# Patient Record
Sex: MALE | Race: OTHER | Employment: UNEMPLOYED | URBAN - METROPOLITAN AREA
[De-identification: names, ages, dates, MRNs, and addresses within clinical notes are randomized per-mention and may not be internally consistent; named-entity substitution may affect disease eponyms.]

---

## 2018-05-21 ENCOUNTER — HOSPITAL ENCOUNTER (EMERGENCY)
Age: 63
Discharge: HOME | End: 2018-05-21
Attending: EMERGENCY MEDICINE | Admitting: EMERGENCY MEDICINE
Payer: MEDICARE

## 2018-05-21 ENCOUNTER — APPOINTMENT (OUTPATIENT)
Dept: GENERAL RADIOLOGY | Age: 63
End: 2018-05-21
Attending: EMERGENCY MEDICINE
Payer: MEDICARE

## 2018-05-21 VITALS
HEIGHT: 62 IN | SYSTOLIC BLOOD PRESSURE: 157 MMHG | OXYGEN SATURATION: 100 % | RESPIRATION RATE: 20 BRPM | WEIGHT: 120 LBS | DIASTOLIC BLOOD PRESSURE: 76 MMHG | HEART RATE: 59 BPM | TEMPERATURE: 98.6 F | BODY MASS INDEX: 22.08 KG/M2

## 2018-05-21 DIAGNOSIS — E87.6 HYPOKALEMIA: ICD-10-CM

## 2018-05-21 DIAGNOSIS — L03.116 LEFT LEG CELLULITIS: ICD-10-CM

## 2018-05-21 DIAGNOSIS — F20.0 PARANOID SCHIZOPHRENIA (HCC): Primary | ICD-10-CM

## 2018-05-21 DIAGNOSIS — N39.0 URINARY TRACT INFECTION WITHOUT HEMATURIA, SITE UNSPECIFIED: ICD-10-CM

## 2018-05-21 LAB
ALBUMIN SERPL-MCNC: 3.8 G/DL (ref 3.4–5)
ALBUMIN/GLOB SERPL: 1.1 {RATIO} (ref 0.8–1.7)
ALP SERPL-CCNC: 28 U/L (ref 45–117)
ALT SERPL-CCNC: 19 U/L (ref 16–61)
AMPHET UR QL SCN: NEGATIVE
ANION GAP SERPL CALC-SCNC: 8 MMOL/L (ref 3–18)
APAP SERPL-MCNC: <2 UG/ML (ref 10–30)
APPEARANCE UR: CLEAR
AST SERPL-CCNC: 22 U/L (ref 15–37)
BACTERIA URNS QL MICRO: ABNORMAL /HPF
BARBITURATES UR QL SCN: NEGATIVE
BASOPHILS # BLD: 0 K/UL (ref 0–0.06)
BASOPHILS NFR BLD: 0 % (ref 0–2)
BENZODIAZ UR QL: NEGATIVE
BILIRUB SERPL-MCNC: 0.7 MG/DL (ref 0.2–1)
BILIRUB UR QL: NEGATIVE
BUN SERPL-MCNC: 23 MG/DL (ref 7–18)
BUN/CREAT SERPL: 18 (ref 12–20)
CALCIUM SERPL-MCNC: 9.5 MG/DL (ref 8.5–10.1)
CANNABINOIDS UR QL SCN: NEGATIVE
CHLORIDE SERPL-SCNC: 106 MMOL/L (ref 100–108)
CO2 SERPL-SCNC: 27 MMOL/L (ref 21–32)
COCAINE UR QL SCN: NEGATIVE
COLOR UR: ABNORMAL
CREAT SERPL-MCNC: 1.25 MG/DL (ref 0.6–1.3)
DIFFERENTIAL METHOD BLD: ABNORMAL
EOSINOPHIL # BLD: 0.1 K/UL (ref 0–0.4)
EOSINOPHIL NFR BLD: 1 % (ref 0–5)
EPITH CASTS URNS QL MICRO: ABNORMAL /LPF (ref 0–5)
ERYTHROCYTE [DISTWIDTH] IN BLOOD BY AUTOMATED COUNT: 14 % (ref 11.6–14.5)
ETHANOL SERPL-MCNC: 3 MG/DL (ref 0–3)
GLOBULIN SER CALC-MCNC: 3.4 G/DL (ref 2–4)
GLUCOSE SERPL-MCNC: 95 MG/DL (ref 74–99)
GLUCOSE UR STRIP.AUTO-MCNC: NEGATIVE MG/DL
HCT VFR BLD AUTO: 37 % (ref 36–48)
HDSCOM,HDSCOM: NORMAL
HGB BLD-MCNC: 12.6 G/DL (ref 13–16)
HGB UR QL STRIP: NEGATIVE
HYALINE CASTS URNS QL MICRO: ABNORMAL /LPF (ref 0–2)
KETONES UR QL STRIP.AUTO: NEGATIVE MG/DL
LEUKOCYTE ESTERASE UR QL STRIP.AUTO: ABNORMAL
LYMPHOCYTES # BLD: 2.7 K/UL (ref 0.9–3.6)
LYMPHOCYTES NFR BLD: 26 % (ref 21–52)
MCH RBC QN AUTO: 31 PG (ref 24–34)
MCHC RBC AUTO-ENTMCNC: 34.1 G/DL (ref 31–37)
MCV RBC AUTO: 90.9 FL (ref 74–97)
METHADONE UR QL: NEGATIVE
MONOCYTES # BLD: 0.9 K/UL (ref 0.05–1.2)
MONOCYTES NFR BLD: 9 % (ref 3–10)
MUCOUS THREADS URNS QL MICRO: ABNORMAL /LPF
NEUTS SEG # BLD: 6.6 K/UL (ref 1.8–8)
NEUTS SEG NFR BLD: 64 % (ref 40–73)
NITRITE UR QL STRIP.AUTO: NEGATIVE
OPIATES UR QL: NEGATIVE
PCP UR QL: NEGATIVE
PH UR STRIP: 5.5 [PH] (ref 5–8)
PLATELET # BLD AUTO: 232 K/UL (ref 135–420)
PMV BLD AUTO: 12.9 FL (ref 9.2–11.8)
POTASSIUM SERPL-SCNC: 3.4 MMOL/L (ref 3.5–5.5)
PROT SERPL-MCNC: 7.2 G/DL (ref 6.4–8.2)
PROT UR STRIP-MCNC: 30 MG/DL
RBC # BLD AUTO: 4.07 M/UL (ref 4.7–5.5)
RBC #/AREA URNS HPF: ABNORMAL /HPF (ref 0–5)
SALICYLATES SERPL-MCNC: 3.7 MG/DL (ref 2.8–20)
SODIUM SERPL-SCNC: 141 MMOL/L (ref 136–145)
SP GR UR REFRACTOMETRY: 1.02 (ref 1–1.03)
UROBILINOGEN UR QL STRIP.AUTO: 1 EU/DL (ref 0.2–1)
VALPROATE SERPL-MCNC: <3 UG/ML (ref 50–100)
WBC # BLD AUTO: 10.3 K/UL (ref 4.6–13.2)
WBC URNS QL MICRO: ABNORMAL /HPF (ref 0–5)

## 2018-05-21 PROCEDURE — 81001 URINALYSIS AUTO W/SCOPE: CPT | Performed by: EMERGENCY MEDICINE

## 2018-05-21 PROCEDURE — 74011250637 HC RX REV CODE- 250/637: Performed by: EMERGENCY MEDICINE

## 2018-05-21 PROCEDURE — 90471 IMMUNIZATION ADMIN: CPT

## 2018-05-21 PROCEDURE — 80307 DRUG TEST PRSMV CHEM ANLYZR: CPT | Performed by: EMERGENCY MEDICINE

## 2018-05-21 PROCEDURE — 85025 COMPLETE CBC W/AUTO DIFF WBC: CPT | Performed by: EMERGENCY MEDICINE

## 2018-05-21 PROCEDURE — 90715 TDAP VACCINE 7 YRS/> IM: CPT | Performed by: EMERGENCY MEDICINE

## 2018-05-21 PROCEDURE — 80053 COMPREHEN METABOLIC PANEL: CPT | Performed by: EMERGENCY MEDICINE

## 2018-05-21 PROCEDURE — 99284 EMERGENCY DEPT VISIT MOD MDM: CPT

## 2018-05-21 PROCEDURE — 74011250636 HC RX REV CODE- 250/636: Performed by: EMERGENCY MEDICINE

## 2018-05-21 PROCEDURE — 80164 ASSAY DIPROPYLACETIC ACD TOT: CPT | Performed by: EMERGENCY MEDICINE

## 2018-05-21 PROCEDURE — 73552 X-RAY EXAM OF FEMUR 2/>: CPT

## 2018-05-21 RX ORDER — POTASSIUM CHLORIDE 750 MG/1
10 CAPSULE, EXTENDED RELEASE ORAL DAILY
Qty: 5 CAP | Refills: 0 | Status: SHIPPED | OUTPATIENT
Start: 2018-05-21 | End: 2018-05-26

## 2018-05-21 RX ORDER — CEPHALEXIN 500 MG/1
500 CAPSULE ORAL 3 TIMES DAILY
Qty: 21 CAP | Refills: 0 | Status: SHIPPED | OUTPATIENT
Start: 2018-05-21 | End: 2018-05-28

## 2018-05-21 RX ORDER — CEPHALEXIN 500 MG/1
500 CAPSULE ORAL 2 TIMES DAILY
Qty: 14 CAP | Refills: 0 | Status: SHIPPED | OUTPATIENT
Start: 2018-05-21 | End: 2018-05-21

## 2018-05-21 RX ORDER — POTASSIUM CHLORIDE 20 MEQ/1
20 TABLET, EXTENDED RELEASE ORAL
Status: COMPLETED | OUTPATIENT
Start: 2018-05-21 | End: 2018-05-21

## 2018-05-21 RX ORDER — CEPHALEXIN 250 MG/1
500 CAPSULE ORAL
Status: COMPLETED | OUTPATIENT
Start: 2018-05-21 | End: 2018-05-21

## 2018-05-21 RX ADMIN — POTASSIUM CHLORIDE 20 MEQ: 20 TABLET, EXTENDED RELEASE ORAL at 14:11

## 2018-05-21 RX ADMIN — TETANUS TOXOID, REDUCED DIPHTHERIA TOXOID AND ACELLULAR PERTUSSIS VACCINE, ADSORBED 0.5 ML: 5; 2.5; 8; 8; 2.5 SUSPENSION INTRAMUSCULAR at 13:19

## 2018-05-21 RX ADMIN — CEPHALEXIN 500 MG: 250 CAPSULE ORAL at 14:11

## 2018-05-21 NOTE — DISCHARGE INSTRUCTIONS
Cellulitis: Care Instructions  Your Care Instructions    Cellulitis is a skin infection. It often occurs after a break in the skin from a scrape, cut, bite, or puncture, or after a rash. The doctor has checked you carefully, but problems can develop later. If you notice any problems or new symptoms, get medical treatment right away. Follow-up care is a key part of your treatment and safety. Be sure to make and go to all appointments, and call your doctor if you are having problems. It's also a good idea to know your test results and keep a list of the medicines you take. How can you care for yourself at home? · Take your antibiotics as directed. Do not stop taking them just because you feel better. You need to take the full course of antibiotics. · Prop up the infected area on pillows to reduce pain and swelling. Try to keep the area above the level of your heart as often as you can. · If your doctor told you how to care for your wound, follow your doctor's instructions. If you did not get instructions, follow this general advice:  ¨ Wash the wound with clean water 2 times a day. Don't use hydrogen peroxide or alcohol, which can slow healing. ¨ You may cover the wound with a thin layer of petroleum jelly, such as Vaseline, and a nonstick bandage. ¨ Apply more petroleum jelly and replace the bandage as needed. · Be safe with medicines. Take pain medicines exactly as directed. ¨ If the doctor gave you a prescription medicine for pain, take it as prescribed. ¨ If you are not taking a prescription pain medicine, ask your doctor if you can take an over-the-counter medicine. To prevent cellulitis in the future  · Try to prevent cuts, scrapes, or other injuries to your skin. Cellulitis most often occurs where there is a break in the skin. · If you get a scrape, cut, mild burn, or bite, wash the wound with clean water as soon as you can to help avoid infection.  Don't use hydrogen peroxide or alcohol, which can slow healing. · If you have swelling in your legs (edema), support stockings and good skin care may help prevent leg sores and cellulitis. · Take care of your feet, especially if you have diabetes or other conditions that increase the risk of infection. Wear shoes and socks. Do not go barefoot. If you have athlete's foot or other skin problems on your feet, talk to your doctor about how to treat them. When should you call for help? Call your doctor now or seek immediate medical care if:  ? · You have signs that your infection is getting worse, such as:  ¨ Increased pain, swelling, warmth, or redness. ¨ Red streaks leading from the area. ¨ Pus draining from the area. ¨ A fever. ? · You get a rash. ? Watch closely for changes in your health, and be sure to contact your doctor if:  ? · You are not getting better after 1 day (24 hours). ? · You do not get better as expected. Where can you learn more? Go to http://timmy-jillian.info/. Gladis Mauro in the search box to learn more about \"Cellulitis: Care Instructions. \"  Current as of: October 13, 2016  Content Version: 11.4  © 9597-2161 [employer]. Care instructions adapted under license by [employer] (which disclaims liability or warranty for this information). If you have questions about a medical condition or this instruction, always ask your healthcare professional. [place] [address] any warranty or liability for your use of this information. Schizophrenia: Care Instructions  Your Care Instructions    Schizophrenia is a disease that makes it hard to think clearly, manage emotions, and interact with other people. It can cause:  · Delusions. These are beliefs that are not real.  · Hallucinations. These are things that you may see or hear that are not really there. · Paranoia. This is the belief that others are lying, cheating, using you, or trying to harm you.   The disease may change your ability to enjoy life, express emotions, or function. At times, you may hear voices, behave strangely, have trouble speaking or understanding speech, or keep to yourself. The goal of treatment is to lower your stress and help your brain function normally. You may need lifelong treatment with medicines and counseling to keep your schizophrenia under control. When schizophrenia is not treated, the risks are higher for suicide, a hospital stay, and other problems. Early treatment called coordinated specialty care [facility]) may help a person who is having his or her first episode of psychotic thoughts. Ask your doctor about [address]. Follow-up care is a key part of your treatment and safety. Be sure to make and go to all appointments, and call your doctor if you are having problems. It's also a good idea to know your test results and keep a list of the medicines you take. How can you care for yourself at home? · Be safe with medicines. Take your medicines exactly as prescribed. Call your doctor if you think you are having a problem with your medicine. When you feel good, you may think that you do not need your medicines. But it is important to keep taking them as scheduled. · Go to your counseling sessions. Call and talk with your counselor if you can't attend or if you don't think the sessions are helping. Do not just stop going. · Eat a healthy diet. Talk with a dietitian about what type of diet may be best for you. · Do not use alcohol or illegal drugs. · Keep the numbers for these national suicide hotlines: 5-393-047-TALK (6-217.281.1542) and 6-517-OTQOVID (0-715.807.4574). If you or someone you know talks about suicide or feeling hopeless, get help right away. What should you do if someone in your family has schizophrenia? · Learn about the disease and how it may get worse over time. · Remind your family member that you love him or her.   · Make a plan with all family members about how to take care of your loved one when his or her symptoms are bad. · Talk about your fears and concerns and those of other family members. Seek counseling if needed. · Do not focus attention only on the person who is in treatment. · Remind yourself that it will take time for changes to occur. · Do not blame yourself for the disease. · Know your legal rights and the legal rights of your family member. · Take care of yourself. Stay involved with your own interests, such as your career, hobbies, and friends. Use exercise, positive self-talk, relaxation, and deep breathing to help lower your stress. · Give yourself time to grieve. You may need to deal with emotions such as anger, fear, and frustration. After you work through your feelings, you will be better able to care for yourself and your family. · If you are having a hard time with your feelings and your interactions with your family member, talk with a counselor. When should you call for help? Call 911 anytime you think you may need emergency care. For example, call if:  ? · You are thinking about suicide or are threatening suicide. ? · You feel like hurting yourself or someone else. ?Call your doctor now or seek immediate medical care if:  ? · You hear voices. ? · You think someone is trying to harm you. ? · You cannot concentrate or are easily confused. ? Watch closely for changes in your health, and be sure to contact your doctor if:  ? · You are having trouble taking care of yourself. ? · You cannot attend your counseling sessions. Where can you learn more? Go to http://timmy-jillian.info/. Enter H521 in the search box to learn more about \"Schizophrenia: Care Instructions. \"  Current as of: May 12, 2017  Content Version: 11.4  © 3398-8602 [employer]. Care instructions adapted under license by [employer] (which disclaims liability or warranty for this information).  If you have questions about a medical condition or this instruction, always ask your healthcare professional. [place] [address] any warranty or liability for your use of this information.

## 2018-05-21 NOTE — ED TRIAGE NOTES
Patient's wife brought  here for a mental health evaluation because he is not on his medications for about 1 week. Patient states that he also missed and appointment with his psychiatrist. Patient denies suicidal and homicidal ideations. Sepsis Screening completed    (  )Patient meets SIRS criteria. ( x )Patient does not meet SIRS criteria.       SIRS Criteria is achieved when two or more of the following are present   Temperature < 96.8°F (36°C) or > 100.9°F (38.3°C)   Heart Rate > 90 beats per minute   Respiratory Rate > 20 breaths per minute   WBC count > 12,000 or <4,000 or > 10% bands

## 2018-05-21 NOTE — ED PROVIDER NOTES
EMERGENCY DEPARTMENT HISTORY AND PHYSICAL EXAM    Date: 5/21/2018  Patient Name: Alena De Leon    History of Presenting Illness     Chief Complaint   Patient presents with    Mental Health Problem         History Provided By: Patient and Patient's Sister    Chief Complaint: mental health problem   Duration: 1 Weeks  Timing:  Worsening  Modifying Factors: No medication taken for symptoms  Associated Symptoms: behavior change    Additional History (Context):   12:23 PM  Alena De Leon is a 59 y.o. male with PMHX of left prosthesis who presents to the emergency department C/O mental health problem onset 1 week ago. Pt's sister reports slight behavior change including \"staring in space\" and cutting his shirt off. Pt's sister reports she's has to ask him questions multiple times for a response. Pt's sister reports she noticed empty prescription bottles including Depakote and Risperidone. Pt states he's been out of prescribed medications for ~1 week. Patient is followed by Dr. Emilie Morris, psychology. No associated symptoms. C/O left hip pain. Associated symptoms include left AKA erythema. Pt reports he cut callus of left AKA two weeks ago. Pt denies suicidal ideations, auditory hallucinations, homicidal ideations, self injury and any other Sx or complaints. PCP: None    Current Outpatient Prescriptions   Medication Sig Dispense Refill    potassium chloride SA (MICRO-K) 10 mEq capsule Take 1 Cap by mouth daily for 5 days. 5 Cap 0    cephALEXin (KEFLEX) 500 mg capsule Take 1 Cap by mouth three (3) times daily for 7 days. 21 Cap 0    levothyroxine (SYNTHROID) 100 mcg tablet Take 100 mcg by mouth Daily (before breakfast).  lisinopril (PRINIVIL, ZESTRIL) 40 mg tablet Take 40 mg by mouth daily.  fenofibrate nanocrystallized (TRICOR) 145 mg tablet Take  by mouth daily.  DIVALPROEX SODIUM (DEPAKOTE PO) Take  by mouth.  RISPERIDONE (RISPERDAL PO) Take  by mouth.       CALCIUM CARBONATE (CALCIUM 500 PO) Take  by mouth.  cyanocobalamin (VITAMIN B-12) 1,000 mcg tablet Take 1,000 mcg by mouth daily. Past History     Past Medical History:  Past Medical History:   Diagnosis Date    Cancer [facility])     Thyroid    Psychiatric disorder     psizophrenic paranoid    Thyroid disease        Past Surgical History:  Past Surgical History:   Procedure Laterality Date    HX ORTHOPAEDIC      amputation left    HX OTHER SURGICAL      thyroid removal    HX THYROIDECTOMY  2008       Family History:  History reviewed. No pertinent family history. Social History:  Social History   Substance Use Topics    Smoking status: Current Every Day Smoker     Packs/day: 0.50     Years: 45.00    Smokeless tobacco: Never Used    Alcohol use No       Allergies: Allergies   Allergen Reactions    Prolixin [Fluphenazine Hcl] Seizures         Review of Systems   Review of Systems   Constitutional: Negative for activity change, appetite change, fever and unexpected weight change. HENT: Negative for congestion and sore throat. Eyes: Negative for pain and redness. Respiratory: Negative for cough and shortness of breath. Cardiovascular: Negative for chest pain and palpitations. Gastrointestinal: Negative for abdominal pain, diarrhea, nausea and vomiting. Endocrine: Negative for polydipsia and polyuria. Genitourinary: Negative for difficulty urinating and dysuria. Musculoskeletal: Positive for arthralgias (hip pain). Negative for back pain and neck pain. Skin: Negative for pallor and rash. Neurological: Negative for headaches. Psychiatric/Behavioral: Positive for behavioral problems. Negative for hallucinations and suicidal ideas. (-) homicidal ideations     All other systems reviewed and are negative.       Physical Exam     Vitals:    05/21/18 1213   BP: 157/76   Pulse: (!) 59   Resp: 20   Temp: 98.6 °F (37 °C)   SpO2: 100%   Weight: 54.4 kg (120 lb)   Height: 5' 2\" (1.575 m)     Physical Exam Constitutional: He is oriented to person, place, and time. He appears well-developed and well-nourished. HENT:   Head: Normocephalic and atraumatic. Right Ear: External ear normal.   Left Ear: External ear normal.   Nose: Nose normal.   Mouth/Throat: Oropharynx is clear and moist.   Eyes: Conjunctivae and EOM are normal. Pupils are equal, round, and reactive to light. Neck: Normal range of motion. Neck supple. No JVD present. No tracheal deviation present. No thyromegaly present. Cardiovascular: Normal rate, regular rhythm, normal heart sounds and intact distal pulses. Exam reveals no gallop and no friction rub. No murmur heard. Pulmonary/Chest: Effort normal and breath sounds normal. No respiratory distress. He has no wheezes. He has no rales. Abdominal: Soft. Bowel sounds are normal. He exhibits no distension and no mass. There is no tenderness. There is no rebound and no guarding. Musculoskeletal: Normal range of motion. Neurological: He is alert and oriented to person, place, and time. He has normal reflexes. No cranial nerve deficit. He exhibits normal muscle tone. Coordination normal.   CN II-XII intact, no facial droop or asymmetry; No pronator drift; finger-nose-finger intact; good  and equal strength 5/5 bilateral upper and lower extremities; DTRs: 2+ upper and lower extremities, symmetric bilaterally; sensation is intact to light touch and position sense upper and lower extremities symmetric bilaterally;  Gait intact with left leg prothesis and cane. Skin: Skin is warm and dry. No rash noted. There is erythema. Erythema and skin broke down at the left AKA stump 4x4 cm area. Psychiatric: He has a normal mood and affect. His behavior is normal.   Nursing note and vitals reviewed.       Diagnostic Study Results     Labs -     Recent Results (from the past 12 hour(s))   CBC WITH AUTOMATED DIFF    Collection Time: 05/21/18 12:34 PM   Result Value Ref Range    WBC 10.3 4.6 - 13.2 K/uL    RBC 4.07 (L) 4.70 - 5.50 M/uL    HGB 12.6 (L) 13.0 - 16.0 g/dL    HCT 37.0 36.0 - 48.0 %    MCV 90.9 74.0 - 97.0 FL    MCH 31.0 24.0 - 34.0 PG    MCHC 34.1 31.0 - 37.0 g/dL    RDW 14.0 11.6 - 14.5 %    PLATELET 473 123 - 289 K/uL    MPV 12.9 (H) 9.2 - 11.8 FL    NEUTROPHILS 64 40 - 73 %    LYMPHOCYTES 26 21 - 52 %    MONOCYTES 9 3 - 10 %    EOSINOPHILS 1 0 - 5 %    BASOPHILS 0 0 - 2 %    ABS. NEUTROPHILS 6.6 1.8 - 8.0 K/UL    ABS. LYMPHOCYTES 2.7 0.9 - 3.6 K/UL    ABS. MONOCYTES 0.9 0.05 - 1.2 K/UL    ABS. EOSINOPHILS 0.1 0.0 - 0.4 K/UL    ABS. BASOPHILS 0.0 0.0 - 0.06 K/UL    DF AUTOMATED     METABOLIC PANEL, COMPREHENSIVE    Collection Time: 05/21/18 12:34 PM   Result Value Ref Range    Sodium 141 136 - 145 mmol/L    Potassium 3.4 (L) 3.5 - 5.5 mmol/L    Chloride 106 100 - 108 mmol/L    CO2 27 21 - 32 mmol/L    Anion gap 8 3.0 - 18 mmol/L    Glucose 95 74 - 99 mg/dL    BUN 23 (H) 7.0 - 18 MG/DL    Creatinine 1.25 0.6 - 1.3 MG/DL    BUN/Creatinine ratio 18 12 - 20      GFR est AA >60 >60 ml/min/1.73m2    GFR est non-AA 59 (L) >60 ml/min/1.73m2    Calcium 9.5 8.5 - 10.1 MG/DL    Bilirubin, total 0.7 0.2 - 1.0 MG/DL    ALT (SGPT) 19 16 - 61 U/L    AST (SGOT) 22 15 - 37 U/L    Alk.  phosphatase 28 (L) 45 - 117 U/L    Protein, total 7.2 6.4 - 8.2 g/dL    Albumin 3.8 3.4 - 5.0 g/dL    Globulin 3.4 2.0 - 4.0 g/dL    A-G Ratio 1.1 0.8 - 1.7     ACETAMINOPHEN    Collection Time: 05/21/18 12:34 PM   Result Value Ref Range    Acetaminophen level <2 (L) 10.0 - 30.0 ug/mL   ETHYL ALCOHOL    Collection Time: 05/21/18 12:34 PM   Result Value Ref Range    ALCOHOL(ETHYL),SERUM 3 0 - 3 MG/DL   SALICYLATE    Collection Time: 05/21/18 12:34 PM   Result Value Ref Range    Salicylate level 3.7 2.8 - 20 MG/DL   URINALYSIS W/ RFLX MICROSCOPIC    Collection Time: 05/21/18 12:55 PM   Result Value Ref Range    Color DARK YELLOW      Appearance CLEAR      Specific gravity 1.023 1.005 - 1.030      pH (UA) 5.5 5.0 - 8.0      Protein 30 (A) NEG mg/dL    Glucose NEGATIVE  NEG mg/dL    Ketone NEGATIVE  NEG mg/dL    Bilirubin NEGATIVE  NEG      Blood NEGATIVE  NEG      Urobilinogen 1.0 0.2 - 1.0 EU/dL    Nitrites NEGATIVE  NEG      Leukocyte Esterase SMALL (A) NEG     DRUG SCREEN, URINE    Collection Time: 05/21/18 12:55 PM   Result Value Ref Range    BENZODIAZEPINES NEGATIVE  NEG      BARBITURATES NEGATIVE  NEG      THC (TH-CANNABINOL) NEGATIVE  NEG      OPIATES NEGATIVE  NEG      PCP(PHENCYCLIDINE) NEGATIVE  NEG      COCAINE NEGATIVE  NEG      AMPHETAMINES NEGATIVE  NEG      METHADONE NEGATIVE  NEG      HDSCOM (NOTE)    URINE MICROSCOPIC ONLY    Collection Time: 05/21/18 12:55 PM   Result Value Ref Range    WBC 4 to 10 0 - 5 /hpf    RBC 0 to 3 0 - 5 /hpf    Epithelial cells FEW 0 - 5 /lpf    Bacteria FEW (A) NEG /hpf    Mucus 1+ (A) NEG /lpf    Hyaline cast 0 to 3 0 - 2 /lpf       Radiologic Studies -   XR FEMUR LT 2 V   Final Result   IMPRESSION:  1. Minimally displaced fracture of the left hip greater trochanter. 2. Moderate severity left hip joint osteoarthritis. 3. Prior left above the knee in interpretation. As read by the radiologist.     CT Results  (Last 48 hours)    None        CXR Results  (Last 48 hours)    None          Medications given in the ED-  Medications   diph,Pertuss(AC),Tet Vac-PF (BOOSTRIX) suspension 0.5 mL (0.5 mL IntraMUSCular Given 5/21/18 1319)   potassium chloride (K-DUR, KLOR-CON) SR tablet 20 mEq (20 mEq Oral Given 5/21/18 1411)   cephALEXin (KEFLEX) capsule 500 mg (500 mg Oral Given 5/21/18 1411)         Medical Decision Making   I am the first provider for this patient. I reviewed the vital signs, available nursing notes, past medical history, past surgical history, family history and social history. Vital Signs-Reviewed the patient's vital signs.     Pulse Oximetry Analysis - 100% on RA      Records Reviewed: Nursing Notes and Old Medical Records    Provider Notes (Medical Decision Making):   Mental Health ddx: psychosis, depression, self mutilation behavior  Hip pain ddx: left hip pain, dislocation, infection, cellulitis, pressure ulcer    Procedures:  Procedures    ED Course:   12:23 PM Initial assessment performed. The patients presenting problems have been discussed, and they are in agreement with the care plan formulated and outlined with them. I have encouraged them to ask questions as they arise throughout their visit. 12:58 PM Discussed patient's history, exam, and available diagnostics results with So Viramontes, who states patient has been missing appointment and that why he hasnt received medication refill. No show for 5/2/18 with psychiatrist.    1:24 PM Discussed patient's history, exam, and available diagnostics results with Amalia Up RN at Dr. Josefina Montano office, who will send refill to [facility]. Wants sister to call and make an appointment for today. Diagnosis and Disposition   Discussion:   Pt was stable in ED without hallucination, SI, or HI. Left hip xray unremarkable. Left stump has mild cellulitis with skin break down no evidence of abscess. Labs remarkable for hypokalemia, patient given potassium. Case discussed with [facility] and Dr. Josefina Montano office . Pt. refills for Depakote and Risperidone were refilled. Pt sister will call today for follow up appointment. Pt given Keflex and potassium for cellulitis and low potassium, respectively . UA shows possible UTI which should be adequately treated with Keflex      DISCHARGE NOTE:  2:09 PM  Anjum Falcon's  results have been reviewed with him. He has been counseled regarding his diagnosis, treatment, and plan. He verbally conveys understanding and agreement of the signs, symptoms, diagnosis, treatment and prognosis and additionally agrees to follow up as discussed. He also agrees with the care-plan and conveys that all of his questions have been answered.   I have also provided discharge instructions for him that include: educational information regarding their diagnosis and treatment, and list of reasons why they would want to return to the ED prior to their follow-up appointment, should his condition change. He has been provided with education for proper emergency department utilization. CLINICAL IMPRESSION:    1. Paranoid schizophrenia ([address]    2. Left leg cellulitis    3. Urinary tract infection without hematuria, site unspecified    4. Hypokalemia        PLAN:  1. D/C Home  2. Discharge Medication List as of 5/21/2018  2:08 PM      START taking these medications    Details   potassium chloride SA (MICRO-K) 10 mEq capsule Take 1 Cap by mouth daily for 5 days. , Print, Disp-5 Cap, R-0      cephALEXin (KEFLEX) 500 mg capsule Take 1 Cap by mouth two (2) times a day for 7 days. , Print, Disp-14 Cap, R-0         CONTINUE these medications which have NOT CHANGED    Details   levothyroxine (SYNTHROID) 100 mcg tablet Take 100 mcg by mouth Daily (before breakfast). , Historical Med      lisinopril (PRINIVIL, ZESTRIL) 40 mg tablet Take 40 mg by mouth daily. , Historical Med      fenofibrate nanocrystallized (TRICOR) 145 mg tablet Take  by mouth daily. , Historical Med      DIVALPROEX SODIUM (DEPAKOTE PO) Take  by mouth., Historical Med      RISPERIDONE (RISPERDAL PO) Take  by mouth., Historical Med      CALCIUM CARBONATE (CALCIUM 500 PO) Take  by mouth., Historical Med      cyanocobalamin (VITAMIN B-12) 1,000 mcg tablet Take 1,000 mcg by mouth daily. , Historical Med           3. Follow-up Information     Follow up With Details Comments Frankie Todd MD Schedule an appointment as soon as possible for a visit For primary care follow up [address]  [address]  [phone]      [facility] EMERGENCY DE[facility] Go to As needed, as symptoms worsen [address] [id number]  [phone]        _______________________________    Attestations:   This note is prepared by Juan Frederick, acting as Scribe for Carrie Fall MD.    Carrie Fall MD:  The scribe's documentation has been prepared under my direction and personally reviewed by me in its entirety.   I confirm that the note above accurately reflects all work, treatment, procedures, and medical decision making performed by me.  _______________________________

## 2018-06-04 ENCOUNTER — HOSPITAL ENCOUNTER (OUTPATIENT)
Dept: PREADMISSION TESTING | Age: 63
Discharge: HOME | End: 2018-06-04
Payer: MEDICARE

## 2018-06-04 VITALS — BODY MASS INDEX: 21.35 KG/M2 | WEIGHT: 116 LBS | HEIGHT: 62 IN

## 2018-06-04 LAB
ANION GAP SERPL CALC-SCNC: 8 MMOL/L (ref 3–18)
ATRIAL RATE: 82 BPM
BUN SERPL-MCNC: 10 MG/DL (ref 7–18)
BUN/CREAT SERPL: 9 (ref 12–20)
CALCIUM SERPL-MCNC: 9.7 MG/DL (ref 8.5–10.1)
CALCULATED P AXIS, ECG09: 88 DEGREES
CALCULATED R AXIS, ECG10: 36 DEGREES
CALCULATED T AXIS, ECG11: 69 DEGREES
CHLORIDE SERPL-SCNC: 106 MMOL/L (ref 100–108)
CO2 SERPL-SCNC: 29 MMOL/L (ref 21–32)
CREAT SERPL-MCNC: 1.12 MG/DL (ref 0.6–1.3)
DIAGNOSIS, 93000: NORMAL
GLUCOSE SERPL-MCNC: 88 MG/DL (ref 74–99)
HCT VFR BLD AUTO: 37.3 % (ref 36–48)
HGB BLD-MCNC: 12.4 G/DL (ref 13–16)
P-R INTERVAL, ECG05: 176 MS
POTASSIUM SERPL-SCNC: 4.4 MMOL/L (ref 3.5–5.5)
Q-T INTERVAL, ECG07: 360 MS
QRS DURATION, ECG06: 92 MS
QTC CALCULATION (BEZET), ECG08: 420 MS
SODIUM SERPL-SCNC: 143 MMOL/L (ref 136–145)
VENTRICULAR RATE, ECG03: 82 BPM

## 2018-06-04 PROCEDURE — 85018 HEMOGLOBIN: CPT | Performed by: OTOLARYNGOLOGY

## 2018-06-04 PROCEDURE — 93005 ELECTROCARDIOGRAM TRACING: CPT

## 2018-06-04 PROCEDURE — 80048 BASIC METABOLIC PNL TOTAL CA: CPT | Performed by: OTOLARYNGOLOGY

## 2018-06-04 RX ORDER — CEFAZOLIN SODIUM/WATER 2 G/20 ML
2 SYRINGE (ML) INTRAVENOUS ONCE
Status: CANCELLED | OUTPATIENT
Start: 2018-06-04 | End: 2018-06-04

## 2018-06-04 RX ORDER — ACETAMINOPHEN 10 MG/ML
1000 INJECTION, SOLUTION INTRAVENOUS
Status: CANCELLED | OUTPATIENT
Start: 2018-06-27 | End: 2018-06-28

## 2018-06-04 RX ORDER — SODIUM CHLORIDE, SODIUM LACTATE, POTASSIUM CHLORIDE, CALCIUM CHLORIDE 600; 310; 30; 20 MG/100ML; MG/100ML; MG/100ML; MG/100ML
125 INJECTION, SOLUTION INTRAVENOUS CONTINUOUS
Status: CANCELLED | OUTPATIENT
Start: 2018-06-04

## 2018-06-04 NOTE — PERIOP NOTES
No sleep apnea or family history of malignant hyperthermia. Prosthetic left leg. Care fusion kit and instructions given and reviewed. Pt is not sure that PCP is aware of the surgery. No participation in clinical trial or research study. Does not meet criteria for special population at this time.

## 2018-06-26 ENCOUNTER — ANESTHESIA EVENT (OUTPATIENT)
Dept: SURGERY | Age: 63
End: 2018-06-26
Payer: MEDICARE

## 2018-06-27 ENCOUNTER — HOSPITAL ENCOUNTER (OUTPATIENT)
Age: 63
Setting detail: OBSERVATION
Discharge: HOME | End: 2018-06-28
Attending: OTOLARYNGOLOGY | Admitting: OTOLARYNGOLOGY
Payer: MEDICARE

## 2018-06-27 ENCOUNTER — ANESTHESIA (OUTPATIENT)
Dept: SURGERY | Age: 63
End: 2018-06-27
Payer: MEDICARE

## 2018-06-27 DIAGNOSIS — C73 THYROID CANCER (HCC): Primary | ICD-10-CM

## 2018-06-27 LAB
PTH-INTACT P EXCISION SERPL-MCNC: 9.8 PG/ML (ref 8–74)
SPECIMEN DRAWN SERPL: 1655

## 2018-06-27 PROCEDURE — 74011250637 HC RX REV CODE- 250/637: Performed by: OTOLARYNGOLOGY

## 2018-06-27 PROCEDURE — 77030011267 HC ELECTRD BLD COVD -A: Performed by: OTOLARYNGOLOGY

## 2018-06-27 PROCEDURE — 74011250636 HC RX REV CODE- 250/636: Performed by: OTOLARYNGOLOGY

## 2018-06-27 PROCEDURE — 74011250636 HC RX REV CODE- 250/636

## 2018-06-27 PROCEDURE — 77030002986 HC SUT PROL J&J -A: Performed by: OTOLARYNGOLOGY

## 2018-06-27 PROCEDURE — 77030011640 HC PAD GRND REM COVD -A: Performed by: OTOLARYNGOLOGY

## 2018-06-27 PROCEDURE — 77030006643: Performed by: ANESTHESIOLOGY

## 2018-06-27 PROCEDURE — 76010000149 HC OR TIME 1 TO 1.5 HR: Performed by: OTOLARYNGOLOGY

## 2018-06-27 PROCEDURE — 76060000033 HC ANESTHESIA 1 TO 1.5 HR: Performed by: OTOLARYNGOLOGY

## 2018-06-27 PROCEDURE — 77030010512 HC APPL CLP LIG J&J -C: Performed by: OTOLARYNGOLOGY

## 2018-06-27 PROCEDURE — 77030010507 HC ADH SKN DERMBND J&J -B: Performed by: OTOLARYNGOLOGY

## 2018-06-27 PROCEDURE — 88305 TISSUE EXAM BY PATHOLOGIST: CPT | Performed by: OTOLARYNGOLOGY

## 2018-06-27 PROCEDURE — 77030008462 HC STPLR SKN PROX J&J -A: Performed by: OTOLARYNGOLOGY

## 2018-06-27 PROCEDURE — 76210000006 HC OR PH I REC 0.5 TO 1 HR: Performed by: OTOLARYNGOLOGY

## 2018-06-27 PROCEDURE — 77030018354 HC SHR COAG HARM4 J&J -E: Performed by: OTOLARYNGOLOGY

## 2018-06-27 PROCEDURE — 77030020782 HC GWN BAIR PAWS FLX 3M -B: Performed by: OTOLARYNGOLOGY

## 2018-06-27 PROCEDURE — 83970 ASSAY OF PARATHORMONE: CPT | Performed by: OTOLARYNGOLOGY

## 2018-06-27 PROCEDURE — 74011000250 HC RX REV CODE- 250

## 2018-06-27 PROCEDURE — 36415 COLL VENOUS BLD VENIPUNCTURE: CPT | Performed by: OTOLARYNGOLOGY

## 2018-06-27 PROCEDURE — 77030008683 HC TU ET CUF COVD -A: Performed by: ANESTHESIOLOGY

## 2018-06-27 PROCEDURE — 88307 TISSUE EXAM BY PATHOLOGIST: CPT | Performed by: OTOLARYNGOLOGY

## 2018-06-27 PROCEDURE — 77030031139 HC SUT VCRL2 J&J -A: Performed by: OTOLARYNGOLOGY

## 2018-06-27 PROCEDURE — 77030002996 HC SUT SLK J&J -A: Performed by: OTOLARYNGOLOGY

## 2018-06-27 PROCEDURE — 77030002933 HC SUT MCRYL J&J -A: Performed by: OTOLARYNGOLOGY

## 2018-06-27 PROCEDURE — 77030038020 HC MANFLD NEPTUNE STRY -B: Performed by: OTOLARYNGOLOGY

## 2018-06-27 PROCEDURE — 74011000250 HC RX REV CODE- 250: Performed by: OTOLARYNGOLOGY

## 2018-06-27 PROCEDURE — 99218 HC RM OBSERVATION: CPT

## 2018-06-27 PROCEDURE — 77030013079 HC BLNKT BAIR HGGR 3M -A: Performed by: ANESTHESIOLOGY

## 2018-06-27 PROCEDURE — 77030008477 HC STYL SATN SLP COVD -A: Performed by: ANESTHESIOLOGY

## 2018-06-27 RX ORDER — ROCURONIUM BROMIDE 10 MG/ML
INJECTION, SOLUTION INTRAVENOUS AS NEEDED
Status: DISCONTINUED | OUTPATIENT
Start: 2018-06-27 | End: 2018-06-27 | Stop reason: HOSPADM

## 2018-06-27 RX ORDER — SODIUM CHLORIDE 0.9 % (FLUSH) 0.9 %
5-10 SYRINGE (ML) INJECTION AS NEEDED
Status: DISCONTINUED | OUTPATIENT
Start: 2018-06-27 | End: 2018-06-28 | Stop reason: HOSPADM

## 2018-06-27 RX ORDER — LISINOPRIL 20 MG/1
40 TABLET ORAL DAILY
Status: DISCONTINUED | OUTPATIENT
Start: 2018-06-28 | End: 2018-06-28 | Stop reason: HOSPADM

## 2018-06-27 RX ORDER — SODIUM CHLORIDE 0.9 % (FLUSH) 0.9 %
5-10 SYRINGE (ML) INJECTION AS NEEDED
Status: DISCONTINUED | OUTPATIENT
Start: 2018-06-27 | End: 2018-06-27 | Stop reason: HOSPADM

## 2018-06-27 RX ORDER — ACETAMINOPHEN 325 MG/1
650 TABLET ORAL
Status: DISCONTINUED | OUTPATIENT
Start: 2018-06-27 | End: 2018-06-28 | Stop reason: HOSPADM

## 2018-06-27 RX ORDER — PROPOFOL 10 MG/ML
INJECTION, EMULSION INTRAVENOUS AS NEEDED
Status: DISCONTINUED | OUTPATIENT
Start: 2018-06-27 | End: 2018-06-27 | Stop reason: HOSPADM

## 2018-06-27 RX ORDER — SODIUM CHLORIDE, SODIUM LACTATE, POTASSIUM CHLORIDE, CALCIUM CHLORIDE 600; 310; 30; 20 MG/100ML; MG/100ML; MG/100ML; MG/100ML
125 INJECTION, SOLUTION INTRAVENOUS CONTINUOUS
Status: DISCONTINUED | OUTPATIENT
Start: 2018-06-27 | End: 2018-06-28 | Stop reason: HOSPADM

## 2018-06-27 RX ORDER — ONDANSETRON 2 MG/ML
4 INJECTION INTRAMUSCULAR; INTRAVENOUS ONCE
Status: DISCONTINUED | OUTPATIENT
Start: 2018-06-27 | End: 2018-06-27 | Stop reason: HOSPADM

## 2018-06-27 RX ORDER — OXYCODONE AND ACETAMINOPHEN 7.5; 325 MG/1; MG/1
1 TABLET ORAL
Status: DISCONTINUED | OUTPATIENT
Start: 2018-06-27 | End: 2018-06-28 | Stop reason: HOSPADM

## 2018-06-27 RX ORDER — MIDAZOLAM HYDROCHLORIDE 1 MG/ML
INJECTION, SOLUTION INTRAMUSCULAR; INTRAVENOUS AS NEEDED
Status: DISCONTINUED | OUTPATIENT
Start: 2018-06-27 | End: 2018-06-27 | Stop reason: HOSPADM

## 2018-06-27 RX ORDER — DEXTROSE 50 % IN WATER (D50W) INTRAVENOUS SYRINGE
25-50 AS NEEDED
Status: DISCONTINUED | OUTPATIENT
Start: 2018-06-27 | End: 2018-06-27 | Stop reason: HOSPADM

## 2018-06-27 RX ORDER — DIVALPROEX SODIUM 250 MG/1
1500 TABLET, EXTENDED RELEASE ORAL
Status: DISCONTINUED | OUTPATIENT
Start: 2018-06-27 | End: 2018-06-28 | Stop reason: HOSPADM

## 2018-06-27 RX ORDER — NALOXONE HYDROCHLORIDE 0.4 MG/ML
0.2 INJECTION, SOLUTION INTRAMUSCULAR; INTRAVENOUS; SUBCUTANEOUS AS NEEDED
Status: DISCONTINUED | OUTPATIENT
Start: 2018-06-27 | End: 2018-06-27 | Stop reason: HOSPADM

## 2018-06-27 RX ORDER — FENTANYL CITRATE 50 UG/ML
INJECTION, SOLUTION INTRAMUSCULAR; INTRAVENOUS AS NEEDED
Status: DISCONTINUED | OUTPATIENT
Start: 2018-06-27 | End: 2018-06-27 | Stop reason: HOSPADM

## 2018-06-27 RX ORDER — LIDOCAINE HYDROCHLORIDE 20 MG/ML
INJECTION, SOLUTION EPIDURAL; INFILTRATION; INTRACAUDAL; PERINEURAL AS NEEDED
Status: DISCONTINUED | OUTPATIENT
Start: 2018-06-27 | End: 2018-06-27 | Stop reason: HOSPADM

## 2018-06-27 RX ORDER — ALBUTEROL SULFATE 0.83 MG/ML
2.5 SOLUTION RESPIRATORY (INHALATION) AS NEEDED
Status: DISCONTINUED | OUTPATIENT
Start: 2018-06-27 | End: 2018-06-27 | Stop reason: HOSPADM

## 2018-06-27 RX ORDER — DIPHENHYDRAMINE HYDROCHLORIDE 50 MG/ML
12.5 INJECTION, SOLUTION INTRAMUSCULAR; INTRAVENOUS
Status: DISCONTINUED | OUTPATIENT
Start: 2018-06-27 | End: 2018-06-27 | Stop reason: HOSPADM

## 2018-06-27 RX ORDER — SODIUM CHLORIDE 0.9 % (FLUSH) 0.9 %
5-10 SYRINGE (ML) INJECTION EVERY 8 HOURS
Status: DISCONTINUED | OUTPATIENT
Start: 2018-06-27 | End: 2018-06-28 | Stop reason: HOSPADM

## 2018-06-27 RX ORDER — GLYCOPYRROLATE 0.2 MG/ML
INJECTION INTRAMUSCULAR; INTRAVENOUS AS NEEDED
Status: DISCONTINUED | OUTPATIENT
Start: 2018-06-27 | End: 2018-06-27 | Stop reason: HOSPADM

## 2018-06-27 RX ORDER — CEFAZOLIN SODIUM/WATER 2 G/20 ML
2 SYRINGE (ML) INTRAVENOUS ONCE
Status: COMPLETED | OUTPATIENT
Start: 2018-06-27 | End: 2018-06-27

## 2018-06-27 RX ORDER — DEXAMETHASONE SODIUM PHOSPHATE 4 MG/ML
INJECTION, SOLUTION INTRA-ARTICULAR; INTRALESIONAL; INTRAMUSCULAR; INTRAVENOUS; SOFT TISSUE AS NEEDED
Status: DISCONTINUED | OUTPATIENT
Start: 2018-06-27 | End: 2018-06-27 | Stop reason: HOSPADM

## 2018-06-27 RX ORDER — ACETAMINOPHEN 10 MG/ML
1000 INJECTION, SOLUTION INTRAVENOUS
Status: COMPLETED | OUTPATIENT
Start: 2018-06-27 | End: 2018-06-27

## 2018-06-27 RX ORDER — FENTANYL CITRATE 50 UG/ML
25 INJECTION, SOLUTION INTRAMUSCULAR; INTRAVENOUS AS NEEDED
Status: DISCONTINUED | OUTPATIENT
Start: 2018-06-27 | End: 2018-06-27 | Stop reason: HOSPADM

## 2018-06-27 RX ORDER — LIDOCAINE HYDROCHLORIDE AND EPINEPHRINE 10; 10 MG/ML; UG/ML
INJECTION, SOLUTION INFILTRATION; PERINEURAL AS NEEDED
Status: DISCONTINUED | OUTPATIENT
Start: 2018-06-27 | End: 2018-06-27 | Stop reason: HOSPADM

## 2018-06-27 RX ORDER — FLUMAZENIL 0.1 MG/ML
0.2 INJECTION INTRAVENOUS
Status: DISCONTINUED | OUTPATIENT
Start: 2018-06-27 | End: 2018-06-27 | Stop reason: HOSPADM

## 2018-06-27 RX ORDER — SODIUM CHLORIDE, SODIUM LACTATE, POTASSIUM CHLORIDE, CALCIUM CHLORIDE 600; 310; 30; 20 MG/100ML; MG/100ML; MG/100ML; MG/100ML
1000 INJECTION, SOLUTION INTRAVENOUS CONTINUOUS
Status: DISCONTINUED | OUTPATIENT
Start: 2018-06-27 | End: 2018-06-27 | Stop reason: HOSPADM

## 2018-06-27 RX ORDER — MAGNESIUM SULFATE 100 %
4 CRYSTALS MISCELLANEOUS AS NEEDED
Status: DISCONTINUED | OUTPATIENT
Start: 2018-06-27 | End: 2018-06-27 | Stop reason: HOSPADM

## 2018-06-27 RX ORDER — HYDROMORPHONE HYDROCHLORIDE 2 MG/ML
0.5 INJECTION, SOLUTION INTRAMUSCULAR; INTRAVENOUS; SUBCUTANEOUS
Status: DISCONTINUED | OUTPATIENT
Start: 2018-06-27 | End: 2018-06-27 | Stop reason: HOSPADM

## 2018-06-27 RX ORDER — ONDANSETRON 2 MG/ML
INJECTION INTRAMUSCULAR; INTRAVENOUS AS NEEDED
Status: DISCONTINUED | OUTPATIENT
Start: 2018-06-27 | End: 2018-06-27 | Stop reason: HOSPADM

## 2018-06-27 RX ADMIN — FENTANYL CITRATE 50 MCG: 50 INJECTION, SOLUTION INTRAMUSCULAR; INTRAVENOUS at 16:16

## 2018-06-27 RX ADMIN — SODIUM CHLORIDE, SODIUM LACTATE, POTASSIUM CHLORIDE, AND CALCIUM CHLORIDE 125 ML/HR: 600; 310; 30; 20 INJECTION, SOLUTION INTRAVENOUS at 13:56

## 2018-06-27 RX ADMIN — FENTANYL CITRATE 25 MCG: 50 INJECTION, SOLUTION INTRAMUSCULAR; INTRAVENOUS at 16:07

## 2018-06-27 RX ADMIN — ROCURONIUM BROMIDE 25 MG: 10 INJECTION, SOLUTION INTRAVENOUS at 16:16

## 2018-06-27 RX ADMIN — PROPOFOL 130 MG: 10 INJECTION, EMULSION INTRAVENOUS at 16:14

## 2018-06-27 RX ADMIN — Medication 2 G: at 16:25

## 2018-06-27 RX ADMIN — ONDANSETRON 4 MG: 2 INJECTION INTRAMUSCULAR; INTRAVENOUS at 16:25

## 2018-06-27 RX ADMIN — SODIUM CHLORIDE, SODIUM LACTATE, POTASSIUM CHLORIDE, AND CALCIUM CHLORIDE: 600; 310; 30; 20 INJECTION, SOLUTION INTRAVENOUS at 17:07

## 2018-06-27 RX ADMIN — PROPOFOL 130 MG: 10 INJECTION, EMULSION INTRAVENOUS at 16:15

## 2018-06-27 RX ADMIN — Medication 10 ML: at 21:53

## 2018-06-27 RX ADMIN — GLYCOPYRROLATE 0.2 MG: 0.2 INJECTION INTRAMUSCULAR; INTRAVENOUS at 16:14

## 2018-06-27 RX ADMIN — SODIUM CHLORIDE, SODIUM LACTATE, POTASSIUM CHLORIDE, AND CALCIUM CHLORIDE 125 ML/HR: 600; 310; 30; 20 INJECTION, SOLUTION INTRAVENOUS at 21:53

## 2018-06-27 RX ADMIN — FENTANYL CITRATE 50 MCG: 50 INJECTION, SOLUTION INTRAMUSCULAR; INTRAVENOUS at 16:37

## 2018-06-27 RX ADMIN — ACETAMINOPHEN 1000 MG: 10 INJECTION, SOLUTION INTRAVENOUS at 16:27

## 2018-06-27 RX ADMIN — MIDAZOLAM HYDROCHLORIDE 2 MG: 1 INJECTION, SOLUTION INTRAMUSCULAR; INTRAVENOUS at 16:07

## 2018-06-27 RX ADMIN — DEXAMETHASONE SODIUM PHOSPHATE 4 MG: 4 INJECTION, SOLUTION INTRA-ARTICULAR; INTRALESIONAL; INTRAMUSCULAR; INTRAVENOUS; SOFT TISSUE at 16:25

## 2018-06-27 RX ADMIN — LIDOCAINE HYDROCHLORIDE 60 MG: 20 INJECTION, SOLUTION EPIDURAL; INFILTRATION; INTRACAUDAL; PERINEURAL at 16:14

## 2018-06-27 RX ADMIN — DIVALPROEX SODIUM 1500 MG: 250 TABLET, EXTENDED RELEASE ORAL at 21:55

## 2018-06-27 NOTE — ROUTINE PROCESS
TRANSFER - IN REPORT:    Verbal report received from Carrie Mcdaniel RN(name) on [address]  being received from [employer]) for routine post - op      Report consisted of patients Situation, Background, Assessment and   Recommendations(SBAR). Information from the following report(s) SBAR, Kardex, OR Summary, MAR, Recent Results and Alarm Parameters  was reviewed with the receiving nurse. Opportunity for questions and clarification was provided. Assessment completed upon patients arrival to unit and care assumed.

## 2018-06-27 NOTE — ANESTHESIA PREPROCEDURE EVALUATION
Anesthetic History   No history of anesthetic complications            Review of Systems / Medical History  Patient summary reviewed, nursing notes reviewed and pertinent labs reviewed    Pulmonary  Within defined limits                 Neuro/Psych         Psychiatric history     Cardiovascular  Within defined limits                Exercise tolerance: >4 METS     GI/Hepatic/Renal           Liver disease    Comments: H/o hep C Endo/Other            Comments: H/o thyroid CA Other Findings              Physical Exam    Airway  Mallampati: II  TM Distance: 4 - 6 cm  Neck ROM: normal range of motion   Mouth opening: Normal     Cardiovascular  Regular rate and rhythm,  S1 and S2 normal,  no murmur, click, rub, or gallop             Dental  No notable dental hx       Pulmonary  Breath sounds clear to auscultation               Abdominal  GI exam deferred       Other Findings            Anesthetic Plan    ASA: 3            Induction: Intravenous  Anesthetic plan and risks discussed with: Patient

## 2018-06-27 NOTE — PERIOP NOTES
TRANSFER - OUT REPORT:    Verbal report given to Victor Manuel Guerrero RN (name) on Carlos Alberto Aguilar  being transferred to 3 S (unit) for routine post - op       Report consisted of patients Situation, Background, Assessment and   Recommendations(SBAR). Information from the following report(s) SBAR, Kardex, OR Summary, Intake/Output, MAR and Cardiac Rhythm NSR was reviewed with the receiving nurse. Lines:   Peripheral IV 06/27/18 Right Forearm (Active)   Site Assessment Clean, dry, & intact 6/27/2018  6:37 PM   Phlebitis Assessment 0 6/27/2018  6:37 PM   Infiltration Assessment 0 6/27/2018  6:37 PM   Dressing Status Clean, dry, & intact 6/27/2018  6:37 PM   Dressing Type Tape;Transparent 6/27/2018  6:37 PM   Hub Color/Line Status Infusing;Pink 6/27/2018  6:37 PM        Opportunity for questions and clarification was provided.       Patient transported with:   O2 @ 2 liters

## 2018-06-27 NOTE — ANESTHESIA POSTPROCEDURE EVALUATION
Post-Anesthesia Evaluation and Assessment    Patient: Riccardo Palacios MRN: [id number]  SSN: [id number]    YOB: 1955  Age: 59 y.o. Sex: male       Cardiovascular Function/Vital Signs  Visit Vitals    /76 (BP 1 Location: Left arm, BP Patient Position: At rest)    Pulse 61    Temp 36.6 °C (97.8 °F)    Resp 17    Ht 5' 2\" (1.575 m)    Wt 52.4 kg (115 lb 8 oz)    SpO2 100%    BMI 21.13 kg/m2       Patient is status post general anesthesia for Procedure(s):  LEFT  NECK DISSECTION. Nausea/Vomiting: None    Postoperative hydration reviewed and adequate. Pain:  Pain Scale 1: Numeric (0 - 10) (06/27/18 1800)  Pain Intensity 1: 0 (06/27/18 1800)   Managed    Neurological Status:   Neuro (WDL): Exceptions to WDL (06/27/18 1800)  Neuro  Neurologic State: Alert (06/27/18 1800)  LUE Motor Response: Purposeful (06/27/18 1800)  LLE Motor Response: Other(comment) (AKA) (06/27/18 1800)  RUE Motor Response: Purposeful (06/27/18 1800)  RLE Motor Response: Purposeful (06/27/18 1800)   At baseline    Mental Status and Level of Consciousness: Arousable    Pulmonary Status:   O2 Device: Nasal cannula (06/27/18 1800)   Adequate oxygenation and airway patent    Complications related to anesthesia: None    Post-anesthesia assessment completed.  No concerns      Signed By: Jessi Holley CRNA     June 27, 2018

## 2018-06-27 NOTE — BRIEF OP NOTE
BRIEF OPERATIVE NOTE    Date of Procedure: 6/27/2018   Preoperative Diagnosis: THYROID NEOPLASM  Postoperative Diagnosis: * No post-op diagnosis entered *    Procedure(s):  LEFT  NECK DISSECTION  Surgeon(s) and Role:     * Zonia Rabago DO - Primary         Surgical Assistant: n/a    Surgical Staff:  Circ-1: Yoly Hernadez  Circ-2: Emma Gomes RN  Scrub Tech-2: Oli Romero  Scrub RN-1: Karyna Sheriff RN  Event Time In   Incision Start 1635   Incision Close      Anesthesia: General   Estimated Blood Loss: nil  Specimens:   ID Type Source Tests Collected by Time Destination   1 : Left Neck Dissection Preservative Neck  Zonia Rabago DO 6/27/2018 1645 Pathology   1 : PTH, POST EXCISION INTRAOPERATIVELY Blood Venous blood PTH, POST ADDL, INTRA-OP Zonia Rabago DO 6/27/2018 1655 Other (See Notes)      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

## 2018-06-27 NOTE — IP AVS SNAPSHOT
[address] [address] 
[phone] Patient: Damaso Wilson MRN: [id number] [facility]:9/8/9373 About your hospitalization You were admitted on:  June 27, 2018 You last received care in the:  [address] [place] [address] You were discharged on:  June 28, 2018 Why you were hospitalized Your primary diagnosis was:  Not on File Follow-up Information Follow up With Details Comments Contact Info None   None (395) Patient stated that they have no PCP Zonia Rabago DO On 7/13/2018 Follow up appointment scheduled for July 13, 2018 at 1:10 p.m. 1113 [facility] [address] [place] [address] 
[phone] Discharge Orders Procedure Order Date Status Priority Quantity Spec Type Associated Dx CALL YOUR DOCTOR For: Difficulty breathing, headache, or visual disturbances. 06/28/18 0927 Normal Routine 1  Thyroid cancer ([facility] [062958] Questions: For:  Difficulty breathing, headache, or visual disturbances. ACTIVITY AFTER DISCHARGE Patient should: Resume activity as tolerated. 06/28/18 0927 Normal Routine 1  Thyroid cancer ([facility] [[id number]] Questions: Patient should:  Resume activity as tolerated. DIET REGULAR No added salt 06/28/18 0927 Normal Routine 1  Thyroid cancer ([facility] [[id number]] Questions: Additional options:  No added salt A check cheikh indicates which time of day the medication should be taken. My Medications CONTINUE taking these medications Instructions Each Dose to Equal  
 Morning Noon Evening Bedtime CALCIUM 500 PO Your last dose was: Your next dose is: Take  by mouth. DEPAKOTE PO Your last dose was: Your next dose is: Take 3 Tabs by mouth nightly. No dose given. Indications: Paranoid jose 3 Tab  
    
   
   
   
  
 levothyroxine 100 mcg tablet Commonly known as:  SYNTHROID Your last dose was: Your next dose is: Take 100 mcg by mouth Daily (before breakfast). Indications: hypothyroidism 100 mcg  
    
   
   
   
  
 lisinopril 40 mg tablet Commonly known as:  Lani Hou Your last dose was: Your next dose is: Take 40 mg by mouth daily. Indications: hypertension 40 mg  
    
   
   
   
  
 RISPERDAL PO Your last dose was: Your next dose is: Take  by mouth nightly. No dose given. Indications: Paranoid jose TRICOR 145 mg tablet Generic drug:  fenofibrate nanocrystallized Your last dose was: Your next dose is: Take 145 mg by mouth daily. Indications: hypercholesterolemia 145 mg  
    
   
   
   
  
 VITAMIN B-12 1,000 mcg tablet Generic drug:  cyanocobalamin Your last dose was: Your next dose is: Take 1,000 mcg by mouth daily. 1000 mcg Discharge Instructions Thyroidectomy: [facility] to [facility] at [facility] Your Recovery Thyroidectomy is the removal of the thyroid gland, which is shaped like a butterfly and lies across the windpipe (trachea). The gland makes hormones that control how your body makes and uses energy (metabolism). A doctor removes the gland when a tumor is present. The doctor may also remove the gland if you have an enlarged thyroid that is causing symptoms that bother you. Most tumors that grow in the thyroid gland are benign, meaning they are not cancer. You may leave the hospital with stitches in the cut (incision) the doctor made. Your doctor will tell you if you need to come back to have these removed. You may still have a tube called a drain in your neck. Your doctor will take this out a few days after your surgery. You may have some trouble chewing and swallowing after you go home.  Your voice probably will be hoarse, and you may have trouble talking. For most people, these problems get better within 3 to 4 months, but it can take as long as a year. In some cases, this surgery causes permanent problems with chewing, speaking, or swallowing. This care sheet gives you a general idea about how long it will take for you to recover. But each person recovers at a different pace. Follow the steps below to get better as quickly as possible. How can you care for yourself at home? Activity ? · Rest when you feel tired. Getting enough sleep will help you recover. When you lie down, put two or three pillows under your head to keep it raised. ? · Try to walk each day. Start by walking a little more than you did the day before. Bit by bit, increase the amount you walk. Walking boosts blood flow and helps prevent pneumonia and constipation. ? · Avoid strenuous physical activity and lifting heavy objects for 3 weeks after surgery or until your doctor says it is okay. ? · Do not over-extend your neck backwards for 2 weeks after surgery. ? · Ask your doctor when you can drive again. ? · You may take a shower, unless you still have a drain near your incision. Pat the incision dry. If you have a drain, follow your doctor's instructions to care for it. Diet ? · If it is painful to swallow, start out with cold drinks, flavored ice pops, and ice cream. Next, try soft foods like pudding, yogurt, canned or cooked fruit, scrambled eggs, and mashed potatoes. Avoid eating hard or scratchy foods like chips or raw vegetables. Avoid orange or tomato juice and other acidic foods that can sting the throat. ? · If you cough right after drinking, try drinking thicker liquids, such as a smoothie. ? · You may notice that your bowel movements are not regular right after your surgery. This is common.  Try to avoid constipation and straining with bowel movements. You may want to take a fiber supplement every day. If you have not had a bowel movement after a couple of days, ask your doctor about taking a mild laxative. Medicines ? · Your doctor will tell you if and when you can restart your medicines. He or she will also give you instructions about taking any new medicines. ? · If you take blood thinners, such as warfarin (Coumadin), clopidogrel (Plavix), or aspirin, be sure to talk to your doctor. He or she will tell you if and when to start taking those medicines again. Make sure that you understand exactly what your doctor wants you to do. ? · Be safe with medicines. Take pain medicines exactly as directed. ¨ If the doctor gave you a prescription medicine for pain, take it as prescribed. ¨ If you are not taking a prescription pain medicine, ask your doctor if you can take an over-the-counter medicine. ? · If you think your pain medicine is making you sick to your stomach: 
¨ Take your medicine after meals (unless your doctor has told you not to). ¨ Ask your doctor for a different pain medicine. ? · Your doctor may prescribe calcium to prevent problems after surgery from low calcium. Not having enough calcium can cause symptoms such as tingling around your mouth or in your hands and feet. ? · Your doctor may have prescribed antibiotics. Take them as directed. Do not stop taking them just because you feel better. You need to take the full course of antibiotics. Incision care ? · If your doctor told you how to care for your incision, follow your doctor's instructions. If you did not get instructions, follow this general advice: ¨ After the first 24 to 48 hours, wash around the wound with clean water 2 times a day. Don't use hydrogen peroxide or alcohol, which can slow healing. ? · You may have a drain near your incision. Your doctor will tell you how to take care of it. Follow-up care is a key part of your treatment and safety. Be sure to make and go to all appointments, and call your doctor if you are having problems. It's also a good idea to know your test results and keep a list of the medicines you take. When should you call for help? Call 911 anytime you think you may need emergency care. For example, call if: 
? · You passed out (lost consciousness). ? · You have sudden chest pain and shortness of breath, or you cough up blood. ? · You have severe trouble breathing. ?Call your doctor now or seek immediate medical care if: 
? · You have loose stitches, or your incision comes open. ? · Bleeding from your incision soaks through your bandages. ? · You have signs of infection, such as: 
¨ Increased pain, swelling, warmth, or redness. ¨ Red streaks leading from the incision. ¨ Pus draining from the incision. ¨ A fever. ? · You have a tingling feeling around your mouth. ? · You have cramping or tingling in your hands and feet. ? Watch closely for changes in your health, and be sure to contact your doctor if: 
? · You have trouble talking. ? · You are sick to your stomach or cannot keep fluids down. ? · You do not have a bowel movement after taking a laxative. Where can you learn more? Go to http://timmy-jillian.info/. Enter [id number] in the search box to learn more about \"Thyroidectomy: What to Expect at Home. \" Current as of: May 12, 2017 Content Version: 11.4 © 6625-5953 Healthwise, Incorporated. Care instructions adapted under license by [employer] (which disclaims liability or warranty for this information). If you have questions about a medical condition or this instruction, always ask your healthcare professional. [place] [address] any warranty or liability for your use of this information. Introducing [facility]! [facility] introduce anya urrutia partes de moran expediente médico, enviar por correo electrónico la oficina de moran médico y solicitar renovaciones de medicamentos en línea. En moran navegador de Internet , Tennis Nickels a https://mychart. [employer]. com/mychart Timoteo clic en el usuario por Vanesa Files? Marvin Steinberg clic aquí en la sesión Soham Blind. Verá la página de registro Mayo. Ingrese moran código de [employer] too y elise aparece a continuación. Usted no tendrá que [employer] código después de deepthi completado el proceso de registro . Si usted no se inscribe antes de la fecha de caducidad , debe solicitar un nuevo código. · MyChart Código de acceso : [id number] Expires: 8/19/2018 12:13 PM 
 
Ingresa los últimos cuatro dígitos de moran Número de Seguro Social ( xxxx ) y fecha de nacimiento ( dd / mm / aaaa ) elise se indica y timoteo clic en Enviar. Usted será llevado a la siguiente página de registro . Crear un ID MyChart . Esta será moran ID de inicio de sesión de MyChart y no puede ser [place] , por lo que pensar en peggy que es Gunjan Treviño y fácil de recordar . Crear peggy contraseña MyChart . Usted puede cambiar moran contraseña en cualquier momento . Ingrese moran Password Reset de preguntas y [place] . [place] se puede utilizar en un momento posterior si usted olvida moran contraseña. Introduzca moran dirección de correo electrónico . Marisa Points recibirá peggy notificación por correo electrónico cuando la nueva información está disponible en MyChart . Suzen [place] clic en Registrarse. Kenton Bennetta juan y descargar porciones de moran expediente médico. 
Timoteo clic en el enlace de descarga del menú Resumen para descargar peggy copia portátil de moran información médica . Si tiene [employer] , por favor visite la sección de preguntas frecuentes del sitio web MyChart . Recuerde, MyChart NO es que se utilizará para las necesidades urgentes. Para emergencias médicas , llame al 911 . Ahora disponible en moran iPhone y Android ! Introducing Primitivo Inman As a [facility] patient, I wanted to make you aware of our electronic visit tool called Primitivo Inman. Recognia allows you to connect within minutes with a medical provider 24 hours a day, seven days a week via a mobile device or tablet or logging into a secure website from your computer. You can access Primitivo Ramírezfin from anywhere in the [place]. A virtual visit might be right for you when you have a simple condition and feel like you just dont want to get out of bed, or cant get away from work for an appointment, when your regular [facility] provider is not available (evenings, weekends or holidays), or when youre out of town and need minor care. Electronic visits cost only $49 and if the GLAMSQUAD/RallyOn provider determines a prescription is needed to treat your condition, one can be electronically transmitted to a nearby pharmacy*. Please take a moment to enroll today if you have not already done so. The enrollment process is free and takes just a few minutes. To enroll, please download the Recognia farzana to your tablet or phone, or visit [email or web address] to enroll on your computer. And, as an [address] [place] [address] patient with a [employer] account, the results of your visits will be scanned into your electronic medical record and your primary care provider will be able to view the scanned results. We urge you to continue to see your regular [facility] provider for your ongoing medical care. And while your primary care provider may not be the one available when you seek a Primitivo Oweningrisfin virtual visit, the peace of mind you get from getting a real diagnosis real time can be priceless. For more information on Primitivo Braydeningrisfin, view our Frequently Asked Questions (FAQs) at [email or web address]. Sincerely, 
 
Jesus Celis MD 
Chief Medical Officer Tino An *:  certain medications cannot be prescribed via Primitivo Inman Unresulted tests-please follow up with your PCP on these results Procedure/Test Authorizing Provider PTH, POST-ADDL, INTRA-OP Edna Cortes DO  
  
Providers Seen During Your Hospitalization Provider Specialty Primary office phone Edna Cortes DO Otolaryngology [phone] Your Primary Care Physician (PCP) Primary Care Physician Office Phone Office Fax NONE ** None ** ** None ** You are allergic to the following Allergen Reactions Prolixin (Fluphenazine Hcl) Seizures Recent Documentation Height Weight BMI Smoking Status 1.575 m 50.3 kg 20.28 kg/m2 Current Every Day Smoker Emergency Contacts Name Discharge Info Relation Home Work Mobile [address] CAREGIVER [3] Other Relative [6] [phone] Jen Lomas DISCHARGE CAREGIVER [3] Other Relative [6]   [phone] Patient Belongings The following personal items are in your possession at time of discharge: 
  Dental Appliances: None  Visual Aid: None      Home Medications: None   Jewelry: Necklace (with Mollie Boston)  Clothing: Undergarments, Footwear, Socks, Shirt, Pants (with Mollie Boston)    Other Valuables: [employer] (wtih Mollie Boston) Please provide this summary of care documentation to your next provider. Signatures-by signing, you are acknowledging that this After Visit Summary has been reviewed with you and you have received a copy. Patient Signature:  ____________________________________________________________ Date:  ____________________________________________________________  
  
[facility] Provider Signature:  ____________________________________________________________ Date:  ____________________________________________________________  
  
  
   
  
[address] [address] 
[phone] Patient: uYry Loera MRN: [id number] CELESTE:[birth year] Sobre fox hospitalización Le admitieron el:  June 27, 2018 Fox tratamiento más reciente [place]:  [facility] [address] SURGICAL/ONCOLOGY Le dieron de malorie el:  June 28, 2018 Por qué le ingresaron Fox diagnosis primaria es:  No está archivado/a Follow-up Information Follow up With Details Comments Contact Info None   None (395) Patient stated that they have no PCP Haritha Leaver, DO On 7/13/2018 Follow up appointment scheduled for July 13, 2018 at 1:10 p.m. 1113 [facility] [address] [place] [address] 
[phone] Discharge Orders Procedure Order Date Status Priority Quantity Spec Type Associated Dx CALL YOUR DOCTOR For: Difficulty breathing, headache, or visual disturbances. 06/28/18 0927 Normal Routine 1  Thyroid cancer ([facility] [[id number]] Questions: For:  Difficulty breathing, headache, or visual disturbances. ACTIVITY AFTER DISCHARGE Patient should: Resume activity as tolerated. 06/28/18 0927 Normal Routine 1  Thyroid cancer ([facility] [[id number]] Questions: Patient should:  Resume activity as tolerated. DIET REGULAR No added salt 06/28/18 0927 Normal Routine 1  [facility] [[id number]] Questions: Additional options:  No added salt A check cheikh indicates which time of day the medication should be taken. My Medications SIGA tomando estos medicamentos Instructions Each Dose to Equal  
 Morning Noon Evening Bedtime CALCIUM 500 PO Your last dose was: Your next dose is: Take  by mouth. DEPAKOTE PO Your last dose was: Your next dose is: Take 3 Tabs by mouth nightly. No dose given. Indications: Paranoid jose 3 Tab  
    
   
   
   
  
 levothyroxine 100 mcg tablet También conocido elise:  SYNTHROID Your last dose was: Your next dose is: Take 100 mcg by mouth Daily (before breakfast). Indications: hypothyroidism 100 mcg  
    
   
   
   
  
 lisinopril 40 mg tablet Canelo aguilera elise:  Maggie Feldman Your last dose was: Your next dose is: Take 40 mg by mouth daily. Indications: hypertension 40 mg  
    
   
   
   
  
 RISPERDAL PO Your last dose was: Your next dose is: Take  by mouth nightly. No dose given. Indications: Paranoid jose TRICOR 145 mg tablet Medicamento genérico:  fenofibrate nanocrystallized Your last dose was: Your next dose is: Take 145 mg by mouth daily. Indications: hypercholesterolemia 145 mg  
    
   
   
   
  
 VITAMIN B-12 1,000 mcg tablet Medicamento genérico:  cyanocobalamin Your last dose was: Your next dose is: Take 1,000 mcg by mouth daily. 1000 mcg Instrucciones a lisa de malorie Thyroidectomy: What to Expect at [facility] Your Recovery Thyroidectomy is the removal of the thyroid gland, which is shaped like a butterfly and lies across the windpipe (trachea). The gland makes hormones that control how your body makes and uses energy (metabolism). A doctor removes the gland when a tumor is present. The doctor may also remove the gland if you have an enlarged thyroid that is causing symptoms that bother you. Most tumors that grow in the thyroid gland are benign, meaning they are not cancer. You may leave the hospital with stitches in the cut (incision) the doctor made. Your doctor will tell you if you need to come back to have these removed. You may still have a tube called a drain in your neck. Your doctor will take this out a few days after your surgery. You may have some trouble chewing and swallowing after you go home. Your voice probably will be hoarse, and you may have trouble talking.  For most people, these problems get better within 3 to 4 months, but it can take as long as a year. In some cases, this surgery causes permanent problems with chewing, speaking, or swallowing. This care sheet gives you a general idea about how long it will take for you to recover. But each person recovers at a different pace. Follow the steps below to get better as quickly as possible. How can you care for yourself at home? Activity ? · Rest when you feel tired. Getting enough sleep will help you recover. When you lie down, put two or three pillows under your head to keep it raised. ? · Try to walk each day. Start by walking a little more than you did the day before. Bit by bit, increase the amount you walk. Walking boosts blood flow and helps prevent pneumonia and constipation. ? · Avoid strenuous physical activity and lifting heavy objects for 3 weeks after surgery or until your doctor says it is okay. ? · Do not over-extend your neck backwards for 2 weeks after surgery. ? · Ask your doctor when you can drive again. ? · You may take a shower, unless you still have a drain near your incision. Pat the incision dry. If you have a drain, follow your doctor's instructions to care for it. Diet ? · If it is painful to swallow, start out with cold drinks, flavored ice pops, and ice cream. Next, try soft foods like pudding, yogurt, canned or cooked fruit, scrambled eggs, and mashed potatoes. Avoid eating hard or scratchy foods like chips or raw vegetables. Avoid orange or tomato juice and other acidic foods that can sting the throat. ? · If you cough right after drinking, try drinking thicker liquids, such as a smoothie. ? · You may notice that your bowel movements are not regular right after your surgery. This is common. Try to avoid constipation and straining with bowel movements. You may want to take a fiber supplement every day.  If you have not had a bowel movement after a couple of days, ask your doctor about taking a mild laxative. Medicines ? · Your doctor will tell you if and when you can restart your medicines. He or she will also give you instructions about taking any new medicines. ? · If you take blood thinners, such as warfarin (Coumadin), clopidogrel (Plavix), or aspirin, be sure to talk to your doctor. He or she will tell you if and when to start taking those medicines again. Make sure that you understand exactly what your doctor wants you to do. ? · Be safe with medicines. Take pain medicines exactly as directed. ¨ If the doctor gave you a prescription medicine for pain, take it as prescribed. ¨ If you are not taking a prescription pain medicine, ask your doctor if you can take an over-the-counter medicine. ? · If you think your pain medicine is making you sick to your stomach: 
¨ Take your medicine after meals (unless your doctor has told you not to). ¨ Ask your doctor for a different pain medicine. ? · Your doctor may prescribe calcium to prevent problems after surgery from low calcium. Not having enough calcium can cause symptoms such as tingling around your mouth or in your hands and feet. ? · Your doctor may have prescribed antibiotics. Take them as directed. Do not stop taking them just because you feel better. You need to take the full course of antibiotics. Incision care ? · If your doctor told you how to care for your incision, follow your doctor's instructions. If you did not get instructions, follow this general advice: ¨ After the first 24 to 48 hours, wash around the wound with clean water 2 times a day. Don't use hydrogen peroxide or alcohol, which can slow healing. ? · You may have a drain near your incision. Your doctor will tell you how to take care of it. Follow-up care is a key part of your treatment and safety.  Be sure to make and go to all appointments, and call your doctor if you are having problems. It's also a good idea to know your test results and keep a list of the medicines you take. When should you call for help? Call 911 anytime you think you may need emergency care. For example, call if: 
? · You passed out (lost consciousness). ? · You have sudden chest pain and shortness of breath, or you cough up blood. ? · You have severe trouble breathing. ?Call your doctor now or seek immediate medical care if: 
? · You have loose stitches, or your incision comes open. ? · Bleeding from your incision soaks through your bandages. ? · You have signs of infection, such as: 
¨ Increased pain, swelling, warmth, or redness. ¨ Red streaks leading from the incision. ¨ Pus draining from the incision. ¨ A fever. ? · You have a tingling feeling around your mouth. ? · You have cramping or tingling in your hands and feet. ? Watch closely for changes in your health, and be sure to contact your doctor if: 
? · You have trouble talking. ? · You are sick to your stomach or cannot keep fluids down. ? · You do not have a bowel movement after taking a laxative. Where can you learn more? Go to http://timmy-jillian.info/. Enter [id number] in the search box to learn more about \"Thyroidectomy: What to Expect at Home. \" Current as of: May 12, 2017 Content Version: 11.4 © 9524-3686 [employer]. Care instructions adapted under license by [employer] (which disclaims liability or warranty for this information). If you have questions about a medical condition or this instruction, always ask your healthcare professional. [address] any warranty or liability for your use of this information. Introducing [facility] SERVICES! [facility] introduce portal paciente MyChart . Ahora se puede acceder a partes de moran expediente médico, enviar por correo electrónico la oficina de moran médico y solicitar renovaciones de medicamentos en línea. En moran navegador de Internet , Umu Elena a [email or web address] Timoteo clic en el usuario por Deepika Baker? Lyndsay Atkinson clic aquí en la sesión Benoit Agarwal. Verá la página de registro Bowie. Ingrese moran código de [employer] too y elise aparece a continuación. Usted no tendrá que [employer] código después de deepthi completado el proceso de registro . Si usted no se inscribe antes de la fecha de caducidad , debe solicitar un nuevo código. · MyChart Código de acceso : [id number] Expires: 8/19/2018 12:13 PM 
 
Ingresa los últimos cuatro dígitos de moran Número de Seguro Social ( xxxx ) y fecha de nacimiento ( dd / mm / aaaa ) elise se indica y timoteo clic en Enviar. Usted será llevado a la siguiente página de registro . Crear un ID MyChart . Esta será moran ID de inicio de sesión de MyChart y no puede ser [place] , por lo que pensar en peggy que es Carlos Garrison y fácil de recordar . Crear peggy contraseña MyChart . Usted puede cambiar moran contraseña en cualquier momento . Ingrese moran Password Reset de preguntas y [place] . [place] se puede utilizar en un momento posterior si usted olvida moran contraseña. Introduzca moran dirección de correo electrónico . Annalise Beltran recibirá peggy notificación por correo electrónico cuando la nueva información está disponible en MyChart . [employer] clic en Registrarse. Natividad Castellon juan y descargar porciones de moran expediente médico. 
Timoteo clic en el enlace de descarga del menú Resumen para descargar peggy copia portátil de moran información médica . Si tiene [employer] , por favor visite la sección de preguntas frecuentes del sitio web MyChart . Recuerde, MyChart NO es que se utilizará para las necesidades urgentes. Para emergencias médicas , llame al 911 . Ahora disponible en moran iPhone y Android ! Introducing Primitivo Inman As a [facility] patient, I wanted to make you aware of our electronic visit tool called [employer]. [employer] 24/7 allows you to connect within minutes with a medical provider 24 hours a day, seven days a week via a mobile device or tablet or logging into a secure website from your computer. You can access [employer] from anywhere in the [place]. A virtual visit might be right for you when you have a simple condition and feel like you just dont want to get out of bed, or cant get away from work for an appointment, when your regular [employer] provider is not available (evenings, weekends or holidays), or when youre out of town and need minor care. Electronic visits cost only $49 and if the [employer] 24/7 provider determines a prescription is needed to treat your condition, one can be electronically transmitted to a nearby pharmacy*. Please take a moment to enroll today if you have not already done so. The enrollment process is free and takes just a few minutes. To enroll, please download the [employer] 24/7 farzana to your tablet or phone, or visit [email or web address] to enroll on your computer. And, as an [address] [place] [address] patient with a [employer] account, the results of your visits will be scanned into your electronic medical record and your primary care provider will be able to view the scanned results. We urge you to continue to see your regular [employer] provider for your ongoing medical care. And while your primary care provider may not be the one available when you seek a [employer] virtual visit, the peace of mind you get from getting a real diagnosis real time can be priceless. For more information on [employer], view our Frequently Asked Questions (FAQs) at [email or web address]. Sincerely, 
 
Junito Hinson MD 
Chief Medical Officer Tino An *:  certain medications cannot be prescribed via [employer] Unresulted tests-please follow up with your PCP on these results Procedimiento/Prueba Autorizada por PTH, POST-ADDL, INTRA-OP Milvia Chester,   
  
Providers Seen During Your Hospitalization Personal [facility] Teléfono principal de la lilya Milvia Chester, DO Otolaryngology [phone] Fox médico de atención primaria (PCP ) Primary Care Physician Office Phone Office Fax NONE ** None ** ** None ** Tiene alergias a lo siguiente Wileen Bonier Prolixin (Fluphenazine Hcl) Seizures Documentación recientes Height Weight BMI (IMC) Estatus de tabaquísmo 1.575 m 50.3 kg 20.28 kg/m2 Current Every Day Smoker Emergency Contacts Name Discharge Info Relation Home Work Mobile [address] CAREGIVER [3] Other Relative [6] [phone] Jen Lomas DISCHARGE CAREGIVER [3] Other Relative [6]   [phone] Patient Belongings The following personal items are in your possession at time of discharge: 
  Dental Appliances: None  Visual Aid: None      Home Medications: None   Jewelry: Necklace (with Quince Dmitriy)  Clothing: Undergarments, Footwear, Socks, Shirt, Pants (with Quince Dmitriy)    Other Valuables: Allen Morillo (wtih Quince Dmitriy) Please provide this summary of care documentation to your next provider. Signatures-by signing, you are acknowledging that this After Visit Summary has been reviewed with you and you have received a copy. Patient Signature:  ____________________________________________________________ Date:  ____________________________________________________________  
  
Riccardo Shabazz Provider Signature:  ____________________________________________________________ Date:  ____________________________________________________________

## 2018-06-27 NOTE — IP AVS SNAPSHOT
Roma Encarnacion 
 
 
 [address] [facility] [address] 
[phone] Patient: Damaso Wilson MRN: [id number] LLB:[birth year] A check cheikh indicates which time of day the medication should be taken. My Medications CONTINUE taking these medications Instructions Each Dose to Equal  
 Morning Noon Evening Bedtime CALCIUM 500 PO Your last dose was: Your next dose is: Take  by mouth. DEPAKOTE PO Your last dose was: Your next dose is: Take 3 Tabs by mouth nightly. No dose given. Indications: Paranoid jose 3 Tab  
    
   
   
   
  
 levothyroxine 100 mcg tablet Commonly known as:  SYNTHROID Your last dose was: Your next dose is: Take 100 mcg by mouth Daily (before breakfast). Indications: hypothyroidism 100 mcg  
    
   
   
   
  
 lisinopril 40 mg tablet Commonly known as:  Fatemeh Rose Your last dose was: Your next dose is: Take 40 mg by mouth daily. Indications: hypertension 40 mg  
    
   
   
   
  
 RISPERDAL PO Your last dose was: Your next dose is: Take  by mouth nightly. No dose given. Indications: Paranoid jose TRICOR 145 mg tablet Generic drug:  fenofibrate nanocrystallized Your last dose was: Your next dose is: Take 145 mg by mouth daily. Indications: hypercholesterolemia 145 mg  
    
   
   
   
  
 VITAMIN B-12 1,000 mcg tablet Generic drug:  cyanocobalamin Your last dose was: Your next dose is: Take 1,000 mcg by mouth daily. 1000 mcg

## 2018-06-27 NOTE — PERIOP NOTES
Dual skin assessment completed by Solis Salse RN and Loretta Moreno RN.   Reviewed PTA medication list with patient/caregiver and patient/caregiver denies any additional medications.

## 2018-06-27 NOTE — PROGRESS NOTES
Beatrice Morillo RN at bedside to receive patient. Dual skin assessment and progress of care completed. Patient stable; dressing CDI. Opportunity questions and clarification given.         06/27/18 1939   Vital Signs   Temp 97.9 °F (36.6 °C)   Pulse (Heart Rate) 60   Resp Rate 18   /75   BP 1 Location Left arm   BP Patient Position At rest   BP 1 Method Automatic   Oxygen Therapy   O2 Sat (%) 100 %   O2 Device Nasal cannula   O2 Flow Rate (L/min) 2 l/min   Airway   Airway Patent

## 2018-06-27 NOTE — PERIOP NOTES
Dr Home Hilario notified of patient having coffee with sugar 8 oz at 0800. No orders given. Okay to proceed.

## 2018-06-27 NOTE — DISCHARGE SUMMARY
Patient assigned to room 315. Geetha Jacobs RN looking at [employer]. Left message with family at phone number provided.

## 2018-06-27 NOTE — PERIOP NOTES
TRANSFER - IN REPORT:    Verbal report received from Paulie Jesus CRNA(name) on [address]  being received from OR(unit) for routine post - op      Report consisted of patients Situation, Background, Assessment and   Recommendations(SBAR). Information from the following report(s) SBAR, OR Summary, Procedure Summary, Intake/Output and MAR was reviewed with the receiving nurse. Opportunity for questions and clarification was provided. Assessment completed upon patients arrival to unit and care assumed.

## 2018-06-28 VITALS
RESPIRATION RATE: 16 BRPM | BODY MASS INDEX: 20.41 KG/M2 | TEMPERATURE: 98.4 F | DIASTOLIC BLOOD PRESSURE: 73 MMHG | SYSTOLIC BLOOD PRESSURE: 140 MMHG | HEART RATE: 60 BPM | OXYGEN SATURATION: 97 % | HEIGHT: 62 IN | WEIGHT: 110.89 LBS

## 2018-06-28 PROCEDURE — 74011250637 HC RX REV CODE- 250/637: Performed by: OTOLARYNGOLOGY

## 2018-06-28 PROCEDURE — 77010033678 HC OXYGEN DAILY

## 2018-06-28 PROCEDURE — 99218 HC RM OBSERVATION: CPT

## 2018-06-28 PROCEDURE — 74011250636 HC RX REV CODE- 250/636: Performed by: OTOLARYNGOLOGY

## 2018-06-28 RX ADMIN — SODIUM CHLORIDE, SODIUM LACTATE, POTASSIUM CHLORIDE, AND CALCIUM CHLORIDE 125 ML/HR: 600; 310; 30; 20 INJECTION, SOLUTION INTRAVENOUS at 05:18

## 2018-06-28 RX ADMIN — LISINOPRIL 40 MG: 20 TABLET ORAL at 09:26

## 2018-06-28 NOTE — PROGRESS NOTES
[facility] ~ Allergy Daily Progress Note     Assessment:     1. POD 1 Left selective neck dissection  2. Thyroid cancer     Plan:     1. Doing very well, no issues  2. D/c home today 6/28  3. May shower  4. Resume all home medications  5. Call for any issues  6. F/u in 2 weeks     Subjective:     Patient's condition is good. Eating, no f/c; voice intact.  No tingling     Objective:     Visit Vitals    /73 (BP 1 Location: Left arm, BP Patient Position: At rest)    Pulse 60    Temp 98.4 °F (36.9 °C)    Resp 16    Ht 5' 2\" (1.575 m)    Wt 50.3 kg (110 lb 14.3 oz)    SpO2 97%    BMI 20.28 kg/m2       Intake and Output:  Current Shift:  06/28 0701 - 06/28 1900  In: -   Out: 650 [Urine:650]  Last three shifts:  06/26 1901 - 06/28 0700  In: 2997.9 [I.V.:2997.9]  Out: 2228 [DECBL:1903]    Lab/Data Reviewed:  Recent Results (from the past 24 hour(s))   PTH, POST-ADDL, INTRA-OP    Collection Time: 06/27/18  4:55 PM   Result Value Ref Range    Time drawn 1655      PTH, post addl, Intra-Op 9.8 8 - 74 pg/mL       Current Facility-Administered Medications   Medication Dose Route Frequency Provider Last Rate Last Dose    lactated Ringers infusion  125 mL/hr IntraVENous CONTINUOUS Richi Vilma,  mL/hr at 06/28/18 0518 125 mL/hr at 06/28/18 0518    divalproex ER (DEPAKOTE ER) 24 hour tablet 1,500 mg  1,500 mg Oral QHS Richi Vilma, DO   1,500 mg at 06/27/18 2155    lisinopril (PRINIVIL, ZESTRIL) tablet 40 mg  40 mg Oral DAILY Richi Vilma, DO        sodium chloride (NS) flush 5-10 mL  5-10 mL IntraVENous Q8H Richi Vilma, DO   10 mL at 06/27/18 2153    sodium chloride (NS) flush 5-10 mL  5-10 mL IntraVENous PRN Richi Vilma, DO        acetaminophen (TYLENOL) tablet 650 mg  650 mg Oral Q4H PRN Richi Vilma, DO        oxyCODONE-acetaminophen (PERCOCET 7.5) 7.5-325 mg per tablet 1 Tab  1 Tab Oral Q4H PRN Ricih Vilma, DO        promethazine [facility]) with saline injection 12.5 mg  12.5 mg IntraVENous Q6H PRN Angela Worthy DO           General: aao x3, vss, afeb  HEENT: wound c/d/i; minimal swelling     Angela Worthy DO

## 2018-06-28 NOTE — PROGRESS NOTES
Transition of Care (KARLA) Plan:        Chart reviewed. Pt admitted for an elective surgical procedure. Pt is independent. Please encourage ambulation. No plan of care needs identified. Anticipate pt will be medically stable for discharge within the next 24-48 hours. CM available to assist as needed. KARLA Transportation:   How is patient being transported at discharge? Family/Friend      When? Once cleared by physician     Is transport scheduled? N/A      Follow-up appointment and transportation:   PCP/Specialist?  See AVS for Appointment         Who is transporting to the follow-up appointment? Self/Family/Friend      Is transport for follow up appointment scheduled? N/A    Communication plan (with patient/family): Who is being called? Patient or Next of Kin? Responsible party? Patient      What number(s) is to be used? See Facesheet      What service provider is calling for [facility] services? When are they calling? Readmission Risk? (Green/Low; Yellow/Moderate; Red/High):  Green      Care Management Interventions  Mode of Transport at Discharge:  Other (see comment) (Family)  Transition of Care Consult (CM Consult): Discharge Planning  Health Maintenance Reviewed: Yes  Current Support Network: Family Lives Nearby  Confirm Follow Up Transport: Self  Discharge Location  Discharge Placement: Home with family assistance

## 2018-06-28 NOTE — OP NOTES
[address] REPORT    Hernandez Haro  MR#: [id number]  : 1955  ACCOUNT #: [de-identified]   DATE OF SERVICE: 2018    PREOPERATIVE DIAGNOSIS:  Recurrent thyroid carcinoma, neck. POSTOPERATIVE DIAGNOSIS:  Recurrent thyroid carcinoma, neck. OPERATION PERFORMED:  Left selective neck dissection. SURGEON:  Basil Grimm DO    ASSISTANT:  n/a    ANESTHESIA:  General endotracheal.    COMPLICATIONS:  None. SPECIMENS REMOVED:  Specimen was sent permanent to pathology. IMPLANTS:  None. ESTIMATED BLOOD LOSS:  Less than 25 mL. INDICATIONS FOR OPERATION:  This is a 60-year-old male with a history of thyroid carcinoma who I did a thyroidectomy and a neck dissection many years ago, had been doing well with postoperative radiation managing his disease. He unfortunately has met his known limit for radioactive iodine and he has had some recurrent disease and a rising thyroglobulin in the neck. We followed this with CAT scan and positive emission tomography. The risks, benefits, and alternatives of operative intervention were discussed with the patient preoperatively. He stated understanding and desired to proceed. DESCRIPTION OF PROCEDURE:  The patient was brought in the operating room and placed supine on the operating table. After induction of general anesthetic, he was pushed away from anesthesia. A surgical pause was performed and the anterior neck was injected with 1% Xylocaine with 1:100,000 epinephrine. I had consented the patient for bilateral neck dissections depending on the disease that I found during the operation, and ultimately I only needed to perform a left selective neck dissection. The patient was then prepped and draped in a standard sterile fashion. Another surgical pause was performed. A 10 blade was then used to make an anterior neck incision of 5 cm. Blunt dissection was carried down to the trachea.   Inspired oxygen was ensured to be less than 40% and then I was able to peel the strap musculature off the trachea and get into the tracheoesophageal groove. I extended the incision up to the left-hand side to the sternocleidomastoid, identified the anterior border of the sternocleidomastoid and dissected from level 3 down to level 4 and into level 6. There was obvious thyroid carcinoma in the bed. This was then carefully dissected and passed off the field as specimen. The wound was reinspected, palpated, no other disease was identified, and then based on these findings, the operation was terminated by irrigating the wound with copious warm water and then closing the incision with 3-0 and 4-0 Monocryl suture and Dermabond surgical glue. The patient was given back to anesthesia, awoken in the operating room without difficulty, and transferred to the PACU with stable vital signs.       DO KATTY Wise / Sukhdev Jacobs  D: 06/28/2018 09:55     T: 06/28/2018 15:38  JOB #: [id number]  CC: Milton Brooks MD  [address] [place] [address], [id number]  [facility]

## 2018-06-28 NOTE — ROUTINE PROCESS
Bedside shift change report given to Allen Sanders RN (oncoming nurse) by Dariela Funk RN (offgoing nurse). Report included the following information SBAR, Kardex, MAR, Recent Results and Alarm Parameters .

## 2018-06-28 NOTE — PROGRESS NOTES
Problem: Falls - Risk of  Goal: *Absence of Falls  Document Alicia Fall Risk and appropriate interventions in the flowsheet.    Outcome: Progressing Towards Goal  Fall Risk Interventions:  Mobility Interventions: Patient to call before getting OOB         Medication Interventions: Evaluate medications/consider consulting pharmacy, Patient to call before getting OOB, Teach patient to arise slowly

## 2018-06-28 NOTE — PROGRESS NOTES
1945- Pt given jello and then pudding and tolerated well. 2000- Patients sister brought pt a sandwich. Pt tolerated well. Shift Summary- Pt had no complaints of pain. Incision CDI. No swelling noted. Pt tolerated a regular diet.        Patient Vitals for the past 12 hrs:   Temp Pulse Resp BP SpO2   06/28/18 0425 97.8 °F (36.6 °C) 60 16 116/59 96 %   06/27/18 2242 97.8 °F (36.6 °C) 60 16 125/67 100 %   06/27/18 2134 97.4 °F (36.3 °C) 80 18 133/66 100 %   06/27/18 2038 97.5 °F (36.4 °C) 80 20 137/65 100 %   06/27/18 1939 97.9 °F (36.6 °C) 60 18 152/75 100 %   06/27/18 1923 - 60 19 - 100 %   06/27/18 1920 - 60 18 152/71 100 %   06/27/18 1915 - 61 18 146/71 100 %   06/27/18 1910 - - 20 152/72 100 %   06/27/18 1905 - - 18 159/68 100 %   06/27/18 1900 - - 16 144/81 100 %   06/27/18 1857 - 61 17 - 100 %   06/27/18 1856 - 60 20 - 100 %   06/27/18 1855 - 62 19 160/72 100 %   06/27/18 1853 - 61 21 - 100 %   06/27/18 1850 - 72 16 160/78 100 %   06/27/18 1845 - 61 16 147/71 100 %   06/27/18 1840 - 60 16 158/70 100 %   06/27/18 1835 - 61 16 154/71 100 %   06/27/18 1830 - 62 16 144/68 100 %   06/27/18 1825 - 60 19 152/72 100 %   06/27/18 1820 - (!) 59 13 151/71 100 %   06/27/18 1815 - 61 16 158/62 100 %   06/27/18 1810 - 74 17 - 100 %   06/27/18 1805 - 63 17 157/85 100 %   06/27/18 1800 97.8 °F (36.6 °C) 61 17 158/76 100 %   06/27/18 1755 - 68 15 163/76 100 %   06/27/18 1750 - 73 17 162/76 100 %   06/27/18 1745 - 77 18 - 100 %   06/27/18 1740 - 84 16 - 100 %   06/27/18 1735 - 92 19 - 100 %   06/27/18 1730 - 100 14 157/84 100 %

## 2018-06-28 NOTE — PROGRESS NOTES
0755 Assumed care of pt at this time. Pt in bed with no signs of distress. Pt left with call light within reach and encouraged to call for assistance. 5026 Head to toe assessment completed at this time  Patient is A&OX4. Pt denies N/V chest pain and SOB or distress. Pt is calm and cooperative. Pedal pulses are present. Capillary refill less than 3 seconds. Skin in warm and dry with dermabond on neck. Lungs are clear bilaterally. Bowel sounds are active. Abdomen is soft and non-tender. SCDS on RLE in place. PT has L BKA . 20 g needle in the RA. Pain scale explained to patient. Reasons for taking PRN meds explained to patient. Patient instructed to call for prn when needed. Pain level is 0. Patient was oriented to call bell and bed function.  Will continue to monitor

## 2018-06-28 NOTE — DISCHARGE INSTRUCTIONS
Thyroidectomy: What to Expect at Home  Your Recovery    Thyroidectomy is the removal of the thyroid gland, which is shaped like a butterfly and lies across the windpipe (trachea). The gland makes hormones that control how your body makes and uses energy (metabolism). A doctor removes the gland when a tumor is present. The doctor may also remove the gland if you have an enlarged thyroid that is causing symptoms that bother you. Most tumors that grow in the thyroid gland are benign, meaning they are not cancer. You may leave the hospital with stitches in the cut (incision) the doctor made. Your doctor will tell you if you need to come back to have these removed. You may still have a tube called a drain in your neck. Your doctor will take this out a few days after your surgery. You may have some trouble chewing and swallowing after you go home. Your voice probably will be hoarse, and you may have trouble talking. For most people, these problems get better within 3 to 4 months, but it can take as long as a year. In some cases, this surgery causes permanent problems with chewing, speaking, or swallowing. This care sheet gives you a general idea about how long it will take for you to recover. But each person recovers at a different pace. Follow the steps below to get better as quickly as possible. How can you care for yourself at home? Activity  ? · Rest when you feel tired. Getting enough sleep will help you recover. When you lie down, put two or three pillows under your head to keep it raised. ? · Try to walk each day. Start by walking a little more than you did the day before. Bit by bit, increase the amount you walk. Walking boosts blood flow and helps prevent pneumonia and constipation. ? · Avoid strenuous physical activity and lifting heavy objects for 3 weeks after surgery or until your doctor says it is okay. ? · Do not over-extend your neck backwards for 2 weeks after surgery.    ? · Ask your doctor when you can drive again. ? · You may take a shower, unless you still have a drain near your incision. Pat the incision dry. If you have a drain, follow your doctor's instructions to care for it. Diet  ? · If it is painful to swallow, start out with cold drinks, flavored ice pops, and ice cream. Next, try soft foods like pudding, yogurt, canned or cooked fruit, scrambled eggs, and mashed potatoes. Avoid eating hard or scratchy foods like chips or raw vegetables. Avoid orange or tomato juice and other acidic foods that can sting the throat. ? · If you cough right after drinking, try drinking thicker liquids, such as a smoothie. ? · You may notice that your bowel movements are not regular right after your surgery. This is common. Try to avoid constipation and straining with bowel movements. You may want to take a fiber supplement every day. If you have not had a bowel movement after a couple of days, ask your doctor about taking a mild laxative. Medicines  ? · Your doctor will tell you if and when you can restart your medicines. He or she will also give you instructions about taking any new medicines. ? · If you take blood thinners, such as warfarin (Coumadin), clopidogrel (Plavix), or aspirin, be sure to talk to your doctor. He or she will tell you if and when to start taking those medicines again. Make sure that you understand exactly what your doctor wants you to do. ? · Be safe with medicines. Take pain medicines exactly as directed. ¨ If the doctor gave you a prescription medicine for pain, take it as prescribed. ¨ If you are not taking a prescription pain medicine, ask your doctor if you can take an over-the-counter medicine. ? · If you think your pain medicine is making you sick to your stomach:  ¨ Take your medicine after meals (unless your doctor has told you not to). ¨ Ask your doctor for a different pain medicine.    ? · Your doctor may prescribe calcium to prevent problems after surgery from low calcium. Not having enough calcium can cause symptoms such as tingling around your mouth or in your hands and feet. ? · Your doctor may have prescribed antibiotics. Take them as directed. Do not stop taking them just because you feel better. You need to take the full course of antibiotics. Incision care  ? · If your doctor told you how to care for your incision, follow your doctor's instructions. If you did not get instructions, follow this general advice:  ¨ After the first 24 to 48 hours, wash around the wound with clean water 2 times a day. Don't use hydrogen peroxide or alcohol, which can slow healing. ? · You may have a drain near your incision. Your doctor will tell you how to take care of it. Follow-up care is a key part of your treatment and safety. Be sure to make and go to all appointments, and call your doctor if you are having problems. It's also a good idea to know your test results and keep a list of the medicines you take. When should you call for help? Call 911 anytime you think you may need emergency care. For example, call if:  ? · You passed out (lost consciousness). ? · You have sudden chest pain and shortness of breath, or you cough up blood. ? · You have severe trouble breathing. ?Call your doctor now or seek immediate medical care if:  ? · You have loose stitches, or your incision comes open. ? · Bleeding from your incision soaks through your bandages. ? · You have signs of infection, such as:  ¨ Increased pain, swelling, warmth, or redness. ¨ Red streaks leading from the incision. ¨ Pus draining from the incision. ¨ A fever. ? · You have a tingling feeling around your mouth. ? · You have cramping or tingling in your hands and feet. ? Watch closely for changes in your health, and be sure to contact your doctor if:  ? · You have trouble talking. ? · You are sick to your stomach or cannot keep fluids down.    ? · You do not have a bowel movement after taking a laxative. Where can you learn more? Go to http://timmy-jillian.info/. Enter [id number] in the search box to learn more about \"Thyroidectomy: What to Expect at Home. \"  Current as of: May 12, 2017  Content Version: 11.4  © 1228-7035 [employer], [employer]. Care instructions adapted under license by [employer] (which disclaims liability or warranty for this information). If you have questions about a medical condition or this instruction, always ask your healthcare professional. [address] any warranty or liability for your use of this information.

## 2018-06-28 NOTE — ROUTINE PROCESS
Dual AVS reviewed with Ryan Gonzalez RN. All medications reviewed individually with patient. Opportunities for questions and concerns provided. Patient discharged via (mode of transport ie. Car, ambulance or air transport) car. Patient's arm band appropriately discarded.

## 2019-06-07 ENCOUNTER — HOSPITAL ENCOUNTER (OUTPATIENT)
Dept: LAB | Age: 64
Discharge: HOME | End: 2019-06-07
Payer: MEDICARE

## 2019-06-07 DIAGNOSIS — C73 MALIGNANT NEOPLASM OF THYROID GLAND (HCC): ICD-10-CM

## 2019-06-07 DIAGNOSIS — E89.0 POSTSURGICAL HYPOTHYROIDISM: ICD-10-CM

## 2019-06-07 LAB — CALCIUM SERPL-MCNC: 9.5 MG/DL (ref 8.5–10.1)

## 2019-06-07 PROCEDURE — 82540 ASSAY OF CREATINE: CPT

## 2019-06-07 PROCEDURE — 86800 THYROGLOBULIN ANTIBODY: CPT

## 2019-06-07 PROCEDURE — 84439 ASSAY OF FREE THYROXINE: CPT

## 2019-06-07 PROCEDURE — 84443 ASSAY THYROID STIM HORMONE: CPT

## 2019-06-07 PROCEDURE — 36415 COLL VENOUS BLD VENIPUNCTURE: CPT

## 2019-06-07 PROCEDURE — 82310 ASSAY OF CALCIUM: CPT

## 2019-06-08 LAB
T4 FREE SERPL-MCNC: 0.6 NG/DL (ref 0.7–1.5)
THYROGLOB AB SERPL-ACNC: <1 IU/ML (ref 0–0.9)
TSH SERPL DL<=0.05 MIU/L-ACNC: >500 UIU/ML (ref 0.36–3.74)

## 2019-06-11 LAB — CREATINE SERPL-MCNC: 0.4 MG/DL (ref 0–0.7)

## 2020-09-17 ENCOUNTER — APPOINTMENT (OUTPATIENT)
Dept: CT IMAGING | Age: 65
DRG: 291 | End: 2020-09-17
Attending: PHYSICIAN ASSISTANT
Payer: MEDICARE

## 2020-09-17 ENCOUNTER — APPOINTMENT (OUTPATIENT)
Dept: GENERAL RADIOLOGY | Age: 65
DRG: 291 | End: 2020-09-17
Attending: PHYSICIAN ASSISTANT
Payer: MEDICARE

## 2020-09-17 ENCOUNTER — HOSPITAL ENCOUNTER (INPATIENT)
Age: 65
LOS: 7 days | Discharge: HOME HEALTH | DRG: 291 | End: 2020-09-24
Attending: EMERGENCY MEDICINE | Admitting: INTERNAL MEDICINE
Payer: MEDICARE

## 2020-09-17 ENCOUNTER — APPOINTMENT (OUTPATIENT)
Dept: VASCULAR SURGERY | Age: 65
DRG: 291 | End: 2020-09-17
Attending: PHYSICIAN ASSISTANT
Payer: MEDICARE

## 2020-09-17 DIAGNOSIS — R09.02 HYPOXIA: ICD-10-CM

## 2020-09-17 DIAGNOSIS — J81.0 ACUTE PULMONARY EDEMA (HCC): Primary | ICD-10-CM

## 2020-09-17 DIAGNOSIS — E03.9 SEVERE HYPOTHYROIDISM: ICD-10-CM

## 2020-09-17 DIAGNOSIS — R06.02 SOBOE (SHORTNESS OF BREATH ON EXERTION): ICD-10-CM

## 2020-09-17 DIAGNOSIS — N17.9 AKI (ACUTE KIDNEY INJURY) (HCC): ICD-10-CM

## 2020-09-17 DIAGNOSIS — R06.02 SOB (SHORTNESS OF BREATH): ICD-10-CM

## 2020-09-17 DIAGNOSIS — I50.810 RIGHT-SIDED CONGESTIVE HEART FAILURE, UNSPECIFIED HF CHRONICITY (HCC): ICD-10-CM

## 2020-09-17 PROBLEM — J81.1 PULMONARY EDEMA: Status: ACTIVE | Noted: 2020-09-17

## 2020-09-17 LAB
ALBUMIN SERPL-MCNC: 3.3 G/DL (ref 3.4–5)
ALBUMIN/GLOB SERPL: 1.1 {RATIO} (ref 0.8–1.7)
ALP SERPL-CCNC: 26 U/L (ref 45–117)
ALT SERPL-CCNC: 27 U/L (ref 16–61)
AMPHET UR QL SCN: NEGATIVE
ANION GAP SERPL CALC-SCNC: 7 MMOL/L (ref 3–18)
APPEARANCE UR: CLEAR
APTT PPP: 31.1 SEC (ref 23–36.4)
AST SERPL-CCNC: 61 U/L (ref 10–38)
BACTERIA URNS QL MICRO: ABNORMAL /HPF
BARBITURATES UR QL SCN: NEGATIVE
BASOPHILS # BLD: 0 K/UL (ref 0–0.1)
BASOPHILS NFR BLD: 0 % (ref 0–2)
BENZODIAZ UR QL: NEGATIVE
BILIRUB SERPL-MCNC: 0.4 MG/DL (ref 0.2–1)
BILIRUB UR QL: NEGATIVE
BNP SERPL-MCNC: 9892 PG/ML (ref 0–900)
BUN SERPL-MCNC: 19 MG/DL (ref 7–18)
BUN/CREAT SERPL: 10 (ref 12–20)
CALCIUM SERPL-MCNC: 8.3 MG/DL (ref 8.5–10.1)
CANNABINOIDS UR QL SCN: NEGATIVE
CHLORIDE SERPL-SCNC: 109 MMOL/L (ref 100–111)
CK MB CFR SERPL CALC: 1 % (ref 0–4)
CK MB CFR SERPL CALC: 1.2 % (ref 0–4)
CK MB SERPL-MCNC: 15.4 NG/ML (ref 5–25)
CK MB SERPL-MCNC: 21.8 NG/ML (ref 5–25)
CK SERPL-CCNC: 1605 U/L (ref 39–308)
CK SERPL-CCNC: 1874 U/L (ref 39–308)
CO2 SERPL-SCNC: 27 MMOL/L (ref 21–32)
COCAINE UR QL SCN: NEGATIVE
COLOR UR: YELLOW
CREAT SERPL-MCNC: 1.89 MG/DL (ref 0.6–1.3)
D DIMER PPP FEU-MCNC: 2.42 UG/ML(FEU)
DIFFERENTIAL METHOD BLD: ABNORMAL
EOSINOPHIL # BLD: 0 K/UL (ref 0–0.4)
EOSINOPHIL NFR BLD: 0 % (ref 0–5)
EPITH CASTS URNS QL MICRO: ABNORMAL /LPF (ref 0–5)
ERYTHROCYTE [DISTWIDTH] IN BLOOD BY AUTOMATED COUNT: 16.8 % (ref 11.6–14.5)
ETHANOL SERPL-MCNC: <3 MG/DL (ref 0–3)
GLOBULIN SER CALC-MCNC: 3.1 G/DL (ref 2–4)
GLUCOSE SERPL-MCNC: 95 MG/DL (ref 74–99)
GLUCOSE UR STRIP.AUTO-MCNC: NEGATIVE MG/DL
HCT VFR BLD AUTO: 35.8 % (ref 36–48)
HDSCOM,HDSCOM: ABNORMAL
HGB BLD-MCNC: 12.1 G/DL (ref 13–16)
HGB UR QL STRIP: ABNORMAL
HYALINE CASTS URNS QL MICRO: ABNORMAL /LPF (ref 0–2)
INR PPP: 1.1 (ref 0.8–1.2)
KETONES UR QL STRIP.AUTO: NEGATIVE MG/DL
LEUKOCYTE ESTERASE UR QL STRIP.AUTO: NEGATIVE
LYMPHOCYTES # BLD: 1.7 K/UL (ref 0.9–3.6)
LYMPHOCYTES NFR BLD: 23 % (ref 21–52)
MCH RBC QN AUTO: 32.1 PG (ref 24–34)
MCHC RBC AUTO-ENTMCNC: 33.8 G/DL (ref 31–37)
MCV RBC AUTO: 95 FL (ref 74–97)
METHADONE UR QL: NEGATIVE
MONOCYTES # BLD: 0.4 K/UL (ref 0.05–1.2)
MONOCYTES NFR BLD: 5 % (ref 3–10)
MUCOUS THREADS URNS QL MICRO: ABNORMAL /LPF
NEUTS SEG # BLD: 5.1 K/UL (ref 1.8–8)
NEUTS SEG NFR BLD: 72 % (ref 40–73)
NITRITE UR QL STRIP.AUTO: NEGATIVE
OPIATES UR QL: POSITIVE
PCP UR QL: NEGATIVE
PH UR STRIP: 7 [PH] (ref 5–8)
PLATELET # BLD AUTO: 213 K/UL (ref 135–420)
PMV BLD AUTO: 13.1 FL (ref 9.2–11.8)
POTASSIUM SERPL-SCNC: 3.3 MMOL/L (ref 3.5–5.5)
PROT SERPL-MCNC: 6.4 G/DL (ref 6.4–8.2)
PROT UR STRIP-MCNC: 100 MG/DL
PROTHROMBIN TIME: 14.3 SEC (ref 11.5–15.2)
RBC # BLD AUTO: 3.77 M/UL (ref 4.7–5.5)
RBC #/AREA URNS HPF: ABNORMAL /HPF (ref 0–5)
SODIUM SERPL-SCNC: 143 MMOL/L (ref 136–145)
SP GR UR REFRACTOMETRY: 1.01 (ref 1–1.03)
TROPONIN I SERPL-MCNC: 0.02 NG/ML (ref 0–0.04)
TROPONIN I SERPL-MCNC: 0.04 NG/ML (ref 0–0.04)
TSH SERPL DL<=0.05 MIU/L-ACNC: 199 UIU/ML (ref 0.36–3.74)
UROBILINOGEN UR QL STRIP.AUTO: 1 EU/DL (ref 0.2–1)
WBC # BLD AUTO: 7.2 K/UL (ref 4.6–13.2)
WBC URNS QL MICRO: ABNORMAL /HPF (ref 0–5)

## 2020-09-17 PROCEDURE — 83880 ASSAY OF NATRIURETIC PEPTIDE: CPT

## 2020-09-17 PROCEDURE — 88112 CYTOPATH CELL ENHANCE TECH: CPT

## 2020-09-17 PROCEDURE — 80307 DRUG TEST PRSMV CHEM ANLYZR: CPT

## 2020-09-17 PROCEDURE — 93971 EXTREMITY STUDY: CPT

## 2020-09-17 PROCEDURE — 74011000636 HC RX REV CODE- 636: Performed by: EMERGENCY MEDICINE

## 2020-09-17 PROCEDURE — 71045 X-RAY EXAM CHEST 1 VIEW: CPT

## 2020-09-17 PROCEDURE — 96374 THER/PROPH/DIAG INJ IV PUSH: CPT

## 2020-09-17 PROCEDURE — 94762 N-INVAS EAR/PLS OXIMTRY CONT: CPT

## 2020-09-17 PROCEDURE — 74011250636 HC RX REV CODE- 250/636: Performed by: PHYSICIAN ASSISTANT

## 2020-09-17 PROCEDURE — 85025 COMPLETE CBC W/AUTO DIFF WBC: CPT

## 2020-09-17 PROCEDURE — 82550 ASSAY OF CK (CPK): CPT

## 2020-09-17 PROCEDURE — P9047 ALBUMIN (HUMAN), 25%, 50ML: HCPCS | Performed by: INTERNAL MEDICINE

## 2020-09-17 PROCEDURE — 77010033678 HC OXYGEN DAILY

## 2020-09-17 PROCEDURE — 93005 ELECTROCARDIOGRAM TRACING: CPT

## 2020-09-17 PROCEDURE — 84443 ASSAY THYROID STIM HORMONE: CPT

## 2020-09-17 PROCEDURE — 65660000000 HC RM CCU STEPDOWN

## 2020-09-17 PROCEDURE — 85379 FIBRIN DEGRADATION QUANT: CPT

## 2020-09-17 PROCEDURE — 81001 URINALYSIS AUTO W/SCOPE: CPT

## 2020-09-17 PROCEDURE — C9113 INJ PANTOPRAZOLE SODIUM, VIA: HCPCS | Performed by: INTERNAL MEDICINE

## 2020-09-17 PROCEDURE — 36415 COLL VENOUS BLD VENIPUNCTURE: CPT

## 2020-09-17 PROCEDURE — 74011250636 HC RX REV CODE- 250/636: Performed by: INTERNAL MEDICINE

## 2020-09-17 PROCEDURE — 83615 LACTATE (LD) (LDH) ENZYME: CPT

## 2020-09-17 PROCEDURE — 80053 COMPREHEN METABOLIC PANEL: CPT

## 2020-09-17 PROCEDURE — 71275 CT ANGIOGRAPHY CHEST: CPT

## 2020-09-17 PROCEDURE — 99285 EMERGENCY DEPT VISIT HI MDM: CPT

## 2020-09-17 PROCEDURE — 85610 PROTHROMBIN TIME: CPT

## 2020-09-17 PROCEDURE — 88305 TISSUE EXAM BY PATHOLOGIST: CPT

## 2020-09-17 PROCEDURE — 84478 ASSAY OF TRIGLYCERIDES: CPT

## 2020-09-17 PROCEDURE — 74011250637 HC RX REV CODE- 250/637: Performed by: INTERNAL MEDICINE

## 2020-09-17 PROCEDURE — 85730 THROMBOPLASTIN TIME PARTIAL: CPT

## 2020-09-17 PROCEDURE — 82042 OTHER SOURCE ALBUMIN QUAN EA: CPT

## 2020-09-17 RX ORDER — PANTOPRAZOLE SODIUM 40 MG/10ML
40 INJECTION, POWDER, LYOPHILIZED, FOR SOLUTION INTRAVENOUS EVERY 24 HOURS
Status: DISCONTINUED | OUTPATIENT
Start: 2020-09-17 | End: 2020-09-19 | Stop reason: CLARIF

## 2020-09-17 RX ORDER — SODIUM CHLORIDE 0.9 % (FLUSH) 0.9 %
5-40 SYRINGE (ML) INJECTION AS NEEDED
Status: DISCONTINUED | OUTPATIENT
Start: 2020-09-17 | End: 2020-09-24 | Stop reason: HOSPADM

## 2020-09-17 RX ORDER — POLYETHYLENE GLYCOL 3350 17 G/17G
17 POWDER, FOR SOLUTION ORAL DAILY PRN
Status: DISCONTINUED | OUTPATIENT
Start: 2020-09-17 | End: 2020-09-24 | Stop reason: HOSPADM

## 2020-09-17 RX ORDER — ACETAMINOPHEN 325 MG/1
650 TABLET ORAL
Status: DISCONTINUED | OUTPATIENT
Start: 2020-09-17 | End: 2020-09-24 | Stop reason: HOSPADM

## 2020-09-17 RX ORDER — ENOXAPARIN SODIUM 100 MG/ML
40 INJECTION SUBCUTANEOUS DAILY
Status: DISCONTINUED | OUTPATIENT
Start: 2020-09-18 | End: 2020-09-20

## 2020-09-17 RX ORDER — POTASSIUM CHLORIDE 20 MEQ/1
40 TABLET, EXTENDED RELEASE ORAL EVERY 4 HOURS
Status: COMPLETED | OUTPATIENT
Start: 2020-09-17 | End: 2020-09-17

## 2020-09-17 RX ORDER — SODIUM CHLORIDE 0.9 % (FLUSH) 0.9 %
5-40 SYRINGE (ML) INJECTION EVERY 8 HOURS
Status: DISCONTINUED | OUTPATIENT
Start: 2020-09-17 | End: 2020-09-24 | Stop reason: HOSPADM

## 2020-09-17 RX ORDER — FUROSEMIDE 10 MG/ML
40 INJECTION INTRAMUSCULAR; INTRAVENOUS ONCE
Status: COMPLETED | OUTPATIENT
Start: 2020-09-17 | End: 2020-09-17

## 2020-09-17 RX ORDER — FUROSEMIDE 10 MG/ML
40 INJECTION INTRAMUSCULAR; INTRAVENOUS EVERY 12 HOURS
Status: DISCONTINUED | OUTPATIENT
Start: 2020-09-17 | End: 2020-09-18

## 2020-09-17 RX ORDER — ACETAMINOPHEN 650 MG/1
650 SUPPOSITORY RECTAL
Status: DISCONTINUED | OUTPATIENT
Start: 2020-09-17 | End: 2020-09-24 | Stop reason: HOSPADM

## 2020-09-17 RX ORDER — ALBUMIN HUMAN 250 G/1000ML
12.5 SOLUTION INTRAVENOUS EVERY 6 HOURS
Status: DISCONTINUED | OUTPATIENT
Start: 2020-09-17 | End: 2020-09-21

## 2020-09-17 RX ADMIN — POTASSIUM CHLORIDE 40 MEQ: 1500 TABLET, EXTENDED RELEASE ORAL at 23:59

## 2020-09-17 RX ADMIN — PANTOPRAZOLE SODIUM 40 MG: 40 INJECTION, POWDER, FOR SOLUTION INTRAVENOUS at 20:39

## 2020-09-17 RX ADMIN — FUROSEMIDE 40 MG: 10 INJECTION, SOLUTION INTRAMUSCULAR; INTRAVENOUS at 16:16

## 2020-09-17 RX ADMIN — ALBUMIN (HUMAN) 12.5 G: 0.25 INJECTION, SOLUTION INTRAVENOUS at 20:40

## 2020-09-17 RX ADMIN — IOPAMIDOL 100 ML: 755 INJECTION, SOLUTION INTRAVENOUS at 15:59

## 2020-09-17 RX ADMIN — FUROSEMIDE 40 MG: 10 INJECTION, SOLUTION INTRAMUSCULAR; INTRAVENOUS at 20:39

## 2020-09-17 RX ADMIN — SODIUM CHLORIDE 500 ML: 900 INJECTION, SOLUTION INTRAVENOUS at 15:14

## 2020-09-17 RX ADMIN — POTASSIUM CHLORIDE 40 MEQ: 1500 TABLET, EXTENDED RELEASE ORAL at 20:39

## 2020-09-17 RX ADMIN — Medication 10 ML: at 22:00

## 2020-09-17 RX ADMIN — LEVOTHYROXINE SODIUM 125 MCG: 0.03 TABLET ORAL at 20:39

## 2020-09-17 NOTE — ED NOTES
TRANSFER - OUT REPORT:    Partial verbal report given to Carlin Garcia RN(name) on Ricardo Blood  being transferred to Telemetry(unit) for routine progression of care  , FULL report to be given bedside upon arrival.     Report consisted of patients Situation, Background, Assessment and   Recommendations(SBAR). Information from the following report(s) SBAR, ED Summary and MAR was reviewed with the receiving nurse. Lines:   Peripheral IV 09/17/20 Left Antecubital (Active)   Site Assessment Clean, dry, & intact 09/17/20 1338   Phlebitis Assessment 0 09/17/20 1338   Infiltration Assessment 0 09/17/20 1338   Dressing Status Clean, dry, & intact 09/17/20 1338   Dressing Type Transparent 09/17/20 1338   Hub Color/Line Status Green 09/17/20 1338   Action Taken Blood drawn 09/17/20 1338        Opportunity for questions and clarification was provided. Patient transported with:   Monitor  O2 @ 4 liters  Registered Nurse  Olita Olszewski MD to bedside upon arrival, with update of care of patient while in the ED.    Telebox placed on patient upon arrival

## 2020-09-17 NOTE — ED TRIAGE NOTES
Pt arrives to ED with c\o RIGHT lower leg swelling x 2 weeks and SOB x 2 days, pt denies CP, pt sts he is out of his synthroid

## 2020-09-17 NOTE — ED NOTES
Telemetry unit aware of SBAR report.  Receiving RN unavailable at this time, Marquis Riggins, will call for report

## 2020-09-17 NOTE — H&P
History & Physical    Patient: Ivania Adames MRN: [id number]  CSN: [id number]    YOB: 1955  Age: 60 y.o. Sex: male      DOA: 9/17/2020    Chief Complaint:   Chief Complaint   Patient presents with    Shortness of Breath    Leg Swelling          HPI:     Ivania Adames is a 60 y.o.  male who has history of thyroid cancer, cirrhosis, congestive heart failure paranoid schizophrenia,  status post above-knee amputation presents to the hospital with complaints of worsening lower extremity edema and dyspnea also patient has not been able to see his PCP for a few months and has not been on his medications. Patient has had the right leg swelling instead extending into his abdomen and genitals this is been progressive over the last 4 months. His sister who used to live with him recently moved out and he has become more short of breath and sent him to the emergency room for further evaluation. In the emergency room he was found to be volume overloaded he was given IV Lasix with improvement  Patient denies any COVID-19 exposures or recent travels a CTA was ordered to rule out pulmonary embolism due to acute kidney injury hydration was recommended his dyspnea worsened after hydration. TSH was found to be over 200    Past Medical History:   Diagnosis Date    Cancer [facility]) 2011    Thyroid    Psychiatric disorder     paranoid schizophrenia     Thyroid disease     hypo       Past Surgical History:   Procedure Laterality Date    HX GI  2018    EGD    HX ORTHOPAEDIC      amputation left- above the knee    HX OTHER SURGICAL      thyroid removal- radioactive iodine treatment    HX OTHER SURGICAL Left     above the knee amputation from a 100 ft fall    HX THYROIDECTOMY  2008       History reviewed. No pertinent family history.     Social History     Socioeconomic History    Marital status: SINGLE     Spouse name: Not on file    Number of children: Not on file    Years of education: Not on file    Highest education level: Not on file   Tobacco Use    Smoking status: Current Every Day Smoker     Packs/day: 1.00     Years: 45.00     Pack years: 45.00    Smokeless tobacco: Never Used    Tobacco comment: No smoking 24 hours prior to surgery. Substance and Sexual Activity    Alcohol use: No     Alcohol/week: 0.0 standard drinks    Drug use: No    Sexual activity: Not Currently       Prior to Admission medications    Medication Sig Start Date End Date Taking? Authorizing Provider   levothyroxine (SYNTHROID) 100 mcg tablet Take 100 mcg by mouth Daily (before breakfast). Indications: hypothyroidism    Provider, Historical   lisinopril (PRINIVIL, ZESTRIL) 40 mg tablet Take 40 mg by mouth daily. Indications: hypertension    Provider, Historical   fenofibrate nanocrystallized (TRICOR) 145 mg tablet Take 145 mg by mouth daily. Indications: hypercholesterolemia    Provider, Historical   DIVALPROEX SODIUM (DEPAKOTE PO) Take 3 Tabs by mouth nightly. No dose given. Indications: Paranoid jose    Provider, Historical   RISPERIDONE (RISPERDAL PO) Take  by mouth nightly. No dose given. Indications: Paranoid jose    Provider, Historical   CALCIUM CARBONATE (CALCIUM 500 PO) Take  by mouth. Provider, Historical   cyanocobalamin (VITAMIN B-12) 1,000 mcg tablet Take 1,000 mcg by mouth daily. Provider, Historical       Allergies   Allergen Reactions    Prolixin [Fluphenazine Hcl] Seizures         Review of Systems  GENERAL: Patient alert, awake and oriented times 3, able to communicate full sentences and not in distress. HEENT: No change in vision, no earache, tinnitus, sore throat or sinus congestion. NECK: No pain or stiffness. PULMONARY: Does not have shortness of breath, no cough or wheeze. Cardiovascular: no pnd or orthopnea, no CP  GASTROINTESTINAL: No abdominal pain, nausea, vomiting or diarrhea, melena or bright red blood per rectum. GENITOURINARY: No urinary frequency, urgency, hesitancy or dysuria. MUSCULOSKELETAL: No joint or muscle pain, no back pain, no recent trauma. Has known above-knee amputation is wearing prosthesis  DERMATOLOGIC: No rash, no itching, no lesions. Edema of his right leg  ENDOCRINE: No polyuria, polydipsia, no heat or cold intolerance. No recent change in weight. Has hypothyroidism  HEMATOLOGICAL: No anemia or easy bruising or bleeding. NEUROLOGIC: No headache, seizures, numbness, tingling positive weakness. Physical Exam:     Physical Exam:  Visit Vitals  BP (!) 155/104   Pulse 93   Temp 97.7 °F (36.5 °C)   Resp 23   SpO2 91%      O2 Device: Room air    Temp (24hrs), Av.7 °F (36.5 °C), Min:97.7 °F (36.5 °C), Max:97.7 °F (36.5 °C)     0701 -  1900  In: 500 [I.V.:500]  Out: -    No intake/output data recorded. General:  Alert, cooperative, no distress, appears stated age. Lethargic but arousable              Head: Normocephalic, without obvious abnormality, atraumatic. Eyes:  Conjunctivae/corneas clear. PERRL,   Anicteric   Nose: Nares normal. No drainage or sinus tenderness. Neck: Supple, symmetrical, trachea midline, no adenopathy, thyroid: no enlargement, no carotid bruit and positive JVD. Lungs:   Clear to auscultation bilaterally. Expiratory wheeze diminished breath sounds at base   Heart:  Regular rate and rhythm, S1, S2 normal.     Abdomen: Soft, non-tender. Bowel sounds normal.  Moderate ascites   Extremities:  Above-knee amputation left right with +2 edema   Pulses: 2+ and symmetric all extremities. Skin:  No rashes or lesions   Neurologic: AAOx3, No focal motor or sensory deficit. Labs Reviewed: All lab results for the last 24 hours reviewed. and EKG    Procedures/imaging: see electronic medical records for all procedures/Xrays and details which were not copied into this note but were reviewed prior to creation of Plan      Assessment/Plan     Active Problems:   1.   Congestive heart failure we will obtain stat echo check cardiac enzymes diurese with IV Lasix check cardiac enzymes  2. Acute kidney injury received hydration post CT scan with worsening edema we will hold angiotensin receptor blocker for now  3. Hypothyroidism with history of thyroid cancer noncompliant with medication restarting Synthroid 100 daily  4. Peripheral vascular disease status postAbove-knee amputation  5. Paranoid schizophrenia off of psychiatric medicines will place on Ativan as needed and Resporal at night    6. Cirrhosis with anasarca we will start IV albumin set up for paracentesis in a.m. IV Protonix INR check p vitamin K f over 2  7. Hypokalemia with hypomagnesia replace according to protocols    X-ray more suggestive of CHF but may need placement so we will check for COVID screening and not diagnostic  DVT/GI Prophylaxis: Hep SQ    Discussed with patient at bedside about hospital admission and my plan care, who understood and agree with my plan care.     Kana Solomon MD  9/17/2020 4:20 PM

## 2020-09-17 NOTE — ED PROVIDER NOTES
EMERGENCY DEPARTMENT HISTORY AND PHYSICAL EXAM    Date: 9/17/2020  Patient Name: Julie Severs    History of Presenting Illness     Chief Complaint   Patient presents with    Shortness of Breath    Leg Swelling         History Provided By: Patient    Chief Complaint: SOB, leg swelling    HPI(Context):   1:21 PM  Julie Severs is a 60 y.o. male with PMHX of thyroid cancer, schizophrenia, tobacco use who presents to the emergency department with sister C/O SOB. Associated sxs include right leg swelling extending to genitals and abdomen. Pt reports intermittent heart racing as well. Sxs x 4 months but worse over last week. Pt lives alone and called his sister stating he is SOB. Pt reports SOB is worse with putting on his clothes. Nothing makes it better. 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 Pt has not seen a doctor in over one year. He takes his medications sporadically. Pt smokes one pack per day. No ETOH or illicit drugs. Pt denies fever, chills, nausea, vomiting, abdominal pain, chest pain, cough, COVID exposures, and any other sxs or complaints. PCP: None    Current Outpatient Medications   Medication Sig Dispense Refill    levothyroxine (SYNTHROID) 100 mcg tablet Take 100 mcg by mouth Daily (before breakfast). Indications: hypothyroidism      lisinopril (PRINIVIL, ZESTRIL) 40 mg tablet Take 40 mg by mouth daily. Indications: hypertension      fenofibrate nanocrystallized (TRICOR) 145 mg tablet Take 145 mg by mouth daily. Indications: hypercholesterolemia      DIVALPROEX SODIUM (DEPAKOTE PO) Take 3 Tabs by mouth nightly. No dose given. Indications: Paranoid jose      RISPERIDONE (RISPERDAL PO) Take  by mouth nightly. No dose given. Indications: Paranoid jose      CALCIUM CARBONATE (CALCIUM 500 PO) Take  by mouth.  cyanocobalamin (VITAMIN B-12) 1,000 mcg tablet Take 1,000 mcg by mouth daily.          Past History     Past Medical History:  Past Medical History:   Diagnosis Date    Cancer [facility] 2011    Thyroid    Psychiatric disorder     paranoid schizophrenia     Thyroid disease     hypo       Past Surgical History:  Past Surgical History:   Procedure Laterality Date    HX GI  2018    EGD    HX ORTHOPAEDIC      amputation left- above the knee    HX OTHER SURGICAL      thyroid removal- radioactive iodine treatment    HX OTHER SURGICAL Left     above the knee amputation from a 100 ft fall    HX THYROIDECTOMY  2008       Family History:  History reviewed. No pertinent family history. Social History:  Social History     Tobacco Use    Smoking status: Current Every Day Smoker     Packs/day: 1.00     Years: 45.00     Pack years: 45.00    Smokeless tobacco: Never Used    Tobacco comment: No smoking 24 hours prior to surgery. Substance Use Topics    Alcohol use: No     Alcohol/week: 0.0 standard drinks    Drug use: No       Allergies: Allergies   Allergen Reactions    Prolixin [Fluphenazine Hcl] Seizures         Review of Systems   Review of Systems   Constitutional: Negative for chills and fever. Respiratory: Positive for shortness of breath. Negative for cough. Cardiovascular: Positive for palpitations and leg swelling. Negative for chest pain. Gastrointestinal: Negative for abdominal pain, nausea and vomiting. Genitourinary: Negative for dysuria. Musculoskeletal: Negative for myalgias. Neurological: Negative for light-headedness. All other systems reviewed and are negative. Physical Exam     Vitals:    09/17/20 1515 09/17/20 1615 09/17/20 1616 09/17/20 1638   BP: (!) 149/98  (!) 155/104 (!) 152/103   Pulse: 90  93 97   Resp: 23   26   Temp:    97.7 °F (36.5 °C)   SpO2: 91% 92%  92%     Physical Exam  Vitals signs and nursing note reviewed. Constitutional:       General: He is not in acute distress. Appearance: He is well-developed. He is not diaphoretic. Comments:  male in NAD. Alert. Mild tachyphnea    Sister at bedside.     HENT:      Head: Normocephalic and atraumatic. Right Ear: External ear normal.      Left Ear: External ear normal.      Nose: Nose normal.      Mouth/Throat:      Mouth: Mucous membranes are dry. Pharynx: Uvula midline. No oropharyngeal exudate, posterior oropharyngeal erythema or uvula swelling. Tonsils: No tonsillar abscesses. Eyes:      Conjunctiva/sclera: Conjunctivae normal.   Neck:      Musculoskeletal: Normal range of motion. Cardiovascular:      Rate and Rhythm: Normal rate and regular rhythm. Pulses:           Dorsalis pedis pulses are 2+ on the right side. Heart sounds: Normal heart sounds. Pulmonary:      Effort: Pulmonary effort is normal. Tachypnea present. No accessory muscle usage or respiratory distress. Breath sounds: Examination of the right-middle field reveals decreased breath sounds. Examination of the left-middle field reveals decreased breath sounds. Examination of the right-lower field reveals decreased breath sounds. Examination of the left-lower field reveals decreased breath sounds. Decreased breath sounds present. No wheezing, rhonchi or rales. Abdominal:      General: Bowel sounds are normal. There is distension. Palpations: Abdomen is soft. Tenderness: There is no abdominal tenderness. There is no right CVA tenderness, left CVA tenderness, guarding or rebound. Genitourinary:     Penis: Swelling present. Musculoskeletal: Normal range of motion. Right upper leg: He exhibits tenderness, swelling and edema. Right lower leg: He exhibits swelling. Edema present. Comments: Left AKA with prosthetic    Skin:     General: Skin is warm and dry. Capillary Refill: Capillary refill takes less than 2 seconds. Neurological:      Mental Status: He is alert and oriented to person, place, and time.    Psychiatric:         Judgment: Judgment normal.             Diagnostic Study Results     Labs -     Recent Results (from the past 12 hour(s))   EKG, 12 LEAD, INITIAL    Collection Time: 09/17/20  1:24 PM   Result Value Ref Range    Ventricular Rate 84 BPM    Atrial Rate 84 BPM    P-R Interval 150 ms    QRS Duration 94 ms    Q-T Interval 384 ms    QTC Calculation (Bezet) 453 ms    Calculated P Axis 60 degrees    Calculated R Axis 18 degrees    Calculated T Axis 50 degrees    Diagnosis       Normal sinus rhythm  Possible Anterior infarct , age undetermined  Abnormal ECG  When compared with ECG of 04-JUN-2018 15:56,  Borderline criteria for Anterior infarct are now present  ST no longer elevated in Anterolateral leads  T wave amplitude has decreased in Inferior leads  Nonspecific T wave abnormality now evident in Lateral leads     CBC WITH AUTOMATED DIFF    Collection Time: 09/17/20  1:32 PM   Result Value Ref Range    WBC 7.2 4.6 - 13.2 K/uL    RBC 3.77 (L) 4.70 - 5.50 M/uL    HGB 12.1 (L) 13.0 - 16.0 g/dL    HCT 35.8 (L) 36.0 - 48.0 %    MCV 95.0 74.0 - 97.0 FL    MCH 32.1 24.0 - 34.0 PG    MCHC 33.8 31.0 - 37.0 g/dL    RDW 16.8 (H) 11.6 - 14.5 %    PLATELET 901 236 - 750 K/uL    MPV 13.1 (H) 9.2 - 11.8 FL    NEUTROPHILS 72 40 - 73 %    LYMPHOCYTES 23 21 - 52 %    MONOCYTES 5 3 - 10 %    EOSINOPHILS 0 0 - 5 %    BASOPHILS 0 0 - 2 %    ABS. NEUTROPHILS 5.1 1.8 - 8.0 K/UL    ABS. LYMPHOCYTES 1.7 0.9 - 3.6 K/UL    ABS. MONOCYTES 0.4 0.05 - 1.2 K/UL    ABS. EOSINOPHILS 0.0 0.0 - 0.4 K/UL    ABS.  BASOPHILS 0.0 0.0 - 0.1 K/UL    DF AUTOMATED     METABOLIC PANEL, COMPREHENSIVE    Collection Time: 09/17/20  1:32 PM   Result Value Ref Range    Sodium 143 136 - 145 mmol/L    Potassium 3.3 (L) 3.5 - 5.5 mmol/L    Chloride 109 100 - 111 mmol/L    CO2 27 21 - 32 mmol/L    Anion gap 7 3.0 - 18 mmol/L    Glucose 95 74 - 99 mg/dL    BUN 19 (H) 7.0 - 18 MG/DL    Creatinine 1.89 (H) 0.6 - 1.3 MG/DL    BUN/Creatinine ratio 10 (L) 12 - 20      GFR est AA 44 (L) >60 ml/min/1.73m2    GFR est non-AA 36 (L) >60 ml/min/1.73m2    Calcium 8.3 (L) 8.5 - 10.1 MG/DL    Bilirubin, total 0.4 0.2 - 1.0 MG/DL    ALT (SGPT) 27 16 - 61 U/L    AST (SGOT) 61 (H) 10 - 38 U/L    Alk.  phosphatase 26 (L) 45 - 117 U/L    Protein, total 6.4 6.4 - 8.2 g/dL    Albumin 3.3 (L) 3.4 - 5.0 g/dL    Globulin 3.1 2.0 - 4.0 g/dL    A-G Ratio 1.1 0.8 - 1.7     CARDIAC PANEL,(CK, CKMB & TROPONIN)    Collection Time: 09/17/20  1:32 PM   Result Value Ref Range    CK - MB 15.4 (H) <3.6 ng/ml    CK-MB Index 1.0 0.0 - 4.0 %    CK 1,605 (H) 39 - 308 U/L    Troponin-I, QT 0.02 0.0 - 0.045 NG/ML   D DIMER    Collection Time: 09/17/20  1:32 PM   Result Value Ref Range    D DIMER 2.42 (H) <0.46 ug/ml(FEU)   PROTHROMBIN TIME + INR    Collection Time: 09/17/20  1:32 PM   Result Value Ref Range    Prothrombin time 14.3 11.5 - 15.2 sec    INR 1.1 0.8 - 1.2     PTT    Collection Time: 09/17/20  1:32 PM   Result Value Ref Range    aPTT 31.1 23.0 - 36.4 SEC   NT-PRO BNP    Collection Time: 09/17/20  1:32 PM   Result Value Ref Range    NT pro-BNP 9,892 (H) 0 - 900 PG/ML   TSH 3RD GENERATION    Collection Time: 09/17/20  1:32 PM   Result Value Ref Range    .00 (H) 0.36 - 3.74 uIU/mL   ETHYL ALCOHOL    Collection Time: 09/17/20  1:32 PM   Result Value Ref Range    ALCOHOL(ETHYL),SERUM <3 0 - 3 MG/DL   URINALYSIS W/ RFLX MICROSCOPIC    Collection Time: 09/17/20  2:45 PM   Result Value Ref Range    Color YELLOW      Appearance CLEAR      Specific gravity 1.014 1.005 - 1.030      pH (UA) 7.0 5.0 - 8.0      Protein 100 (A) NEG mg/dL    Glucose Negative NEG mg/dL    Ketone Negative NEG mg/dL    Bilirubin Negative NEG      Blood SMALL (A) NEG      Urobilinogen 1.0 0.2 - 1.0 EU/dL    Nitrites Negative NEG      Leukocyte Esterase Negative NEG     DRUG SCREEN, URINE    Collection Time: 09/17/20  2:45 PM   Result Value Ref Range    BENZODIAZEPINES Negative NEG      BARBITURATES Negative NEG      THC (TH-CANNABINOL) Negative NEG      OPIATES Positive (A) NEG      PCP(PHENCYCLIDINE) Negative NEG      COCAINE Negative NEG      AMPHETAMINES Negative NEG METHADONE Negative NEG      HDSCOM (NOTE)    URINE MICROSCOPIC ONLY    Collection Time: 09/17/20  2:45 PM   Result Value Ref Range    WBC 0 to 3 0 - 5 /hpf    RBC 0 to 1 0 - 5 /hpf    Epithelial cells FEW 0 - 5 /lpf    Bacteria 1+ (A) NEG /hpf    Mucus 1+ (A) NEG /lpf    Hyaline cast 0 to 2 0 - 2 /lpf         CTA CHEST W OR W WO CONT   Final Result   IMPRESSION:      1. No evidence of pulmonary thromboembolic disease. 2. Diffusely increased interstitial and interseptal thickening, inferior reflux   of contrast into the hepatic cava, and several regions of groundglass opacity   with small, left greater than right pleural effusions; findings are suggestive   of acute, probably cardiogenic pulmonary edema and right heart failure. 3. Partially visualized upper abdominal ascites, hepatic morphology raises   concern for hepatic cirrhosis. XR CHEST PORT   Final Result   IMPRESSION:      Confluent opacities in the left lung base likely combination of small effusion   and nonspecific airspace disease and/or atelectasis. Prominent interstitial   markings bilaterally suggesting interstitial edema . Mildly prominent cardiac   silhouette. CT Results  (Last 48 hours)               09/17/20 1613  CTA CHEST W OR W WO CONT Final result    Impression:  IMPRESSION:       1. No evidence of pulmonary thromboembolic disease. 2. Diffusely increased interstitial and interseptal thickening, inferior reflux   of contrast into the hepatic cava, and several regions of groundglass opacity   with small, left greater than right pleural effusions; findings are suggestive   of acute, probably cardiogenic pulmonary edema and right heart failure. 3. Partially visualized upper abdominal ascites, hepatic morphology raises   concern for hepatic cirrhosis.                                                                                        Narrative:  EXAM: CTA CHEST W OR W WO CONT       CLINICAL INDICATION/HISTORY: Chest pain with elevated d-dimer       COMPARISON: Chest radiograph same day       TECHNIQUE: Thin section CT was performed through the chest during pulmonary   arterial enhancement. MIP 3D reconstructions were generated to increase   sensitivity in the detection of pulmonary emboli. One or more dose reduction   techniques were used on this CT: automated exposure control, adjustment of the   mAs and/or kVp according to patient size, and iterative reconstruction   techniques. The specific techniques used on this CT exam have been documented   in the patient's electronic medical record. Digital Imaging and Communications   in Medicine (DICOM) format image data are available to nonaffiliated external   healthcare facilities or entities on a secure, media free, reciprocally   searchable basis with patient authorization for at least a 12-month period after   this study. --- EXAM QUALITY ---       1. Overall exam quality- satisfactory: adequate    2. Adequacy of pulmonary enhancement- adequate: sufficient enhancement for   diagnosis down to and including subsegmental vessels. Main PA density 190-250   HU   3. Adequacy of breath hold- adequate: sufficient enough to render diagnosis    4. There are no significant noise, beam hardening, streak artifacts affecting   scan quality. _______________       FINDINGS:       ---  PULMONARY CT ANGIOGRAM  ---       PULMONARY VASCULATURE: The right and left central, primary segmental and   subsegmental pulmonary arteries appear patent. There is no convincing evidence   of intraluminal filling defect identified to suggest pulmonary embolism. THORACIC AORTA: Normal caliber thoracic aorta. No aortic aneurysm, intramural   hematoma or dissection. ---  CT CHEST ---       LUNG PARENCHYMA:   Diffusely increased interstitial and interseptal thickening   is seen throughout the lungs.  Multiple regions of groundglass opacity are also   present. There are a few more focal regions of solid and subpleural groundglass   nodularity as well. Moderate, passive atelectasis throughout basilar segments of   left greater than right lower lobes adjacent to pleural effusions. There are   dense segmental or lobar consolidation. PLEURAL SPACES:   Small left and tiny right pleural effusions. No pneumothorax. MEDIASTINUM/CHEST WALL:   Global cardiomegaly. Pericardial effusion measures up   to 0.9 cm in thickness. No global cardiomegaly. No pericardial effusion. Mild   soft tissue anasarca. OSSEOUS STRUCTURES:   No acute osseous abnormality. Moderate midthoracic   degenerative disk disease. UPPER ABDOMEN: Fatty morphology concerning for cirrhosis. Partially visualized,   mild upper abdominal ascites. Abnormal, inferior reflux of contrast throughout   the hepatic cava. Small hiatal hernia.   _______________               CXR Results  (Last 48 hours)               09/17/20 1410  XR CHEST PORT Final result    Impression:  IMPRESSION:       Confluent opacities in the left lung base likely combination of small effusion   and nonspecific airspace disease and/or atelectasis. Prominent interstitial   markings bilaterally suggesting interstitial edema . Mildly prominent cardiac   silhouette. Narrative:  EXAM: One-view chest       CLINICAL HISTORY: Short of breath ,       COMPARISON: None       FINDINGS:       Frontal view of the chest demonstrate prominent interstitial markings   bilaterally. . Confluent opacities left lung base. Cardiac silhouette is   moderately enlarged in size and contour. No acute bony or soft tissue   abnormality. DUPLEX RLE  · No evidence of deep vein thrombosis in the right lower extremity.      Unable to examine contralateral common femoral vein secondary to prosthesis reaching high at the level of hip       Lower Extremity Venous Findings     Right Lower Venous     No evidence of deep vein thrombosis in the right lower extremity. The common femoral, profunda femoral, femoral, popliteal, posterior tibial, peroneal and saphenous femoral junction vein(s) were imaged in the transverse and longitudinal planes. The vessels showed normal color filling and compressibility. Doppler interrogation of the veins showed phasic and spontaneous flow. There is a fluid filled area seen in the right popliteal fossa that could be indicative of a Baker's cyst. Clinical correlation is recommended. Cyst dimensions are: 4.6 cm X 2.5 cm. Medications given in the ED-  Medications   sodium chloride 0.9 % bolus infusion 500 mL (0 mL IntraVENous IV Completed 9/17/20 1537)   furosemide (LASIX) injection 40 mg (40 mg IntraVENous Given 9/17/20 1616)   iopamidoL (ISOVUE-370) 76 % injection  mL (100 mL IntraVENous Given 9/17/20 1559)         Medical Decision Making   I am the first provider for this patient. I reviewed the vital signs, available nursing notes, past medical history, past surgical history, family history and social history. Vital Signs-Reviewed the patient's vital signs. Pulse Oximetry Analysis - 91% on RA. low    Records Reviewed: Nursing Notes, Old Medical Records, Previous Radiology Studies and Previous Laboratory Studies    Provider Notes (Medical Decision Making): CHF, COPD, PNA, PTX, PE, arrhythmia, ACS/MI, renal    Procedures:  Procedures    ED Course:   1:21 PM Initial assessment performed. The patients presenting problems have been discussed, and they are in agreement with the care plan formulated and outlined with them. I have encouraged them to ask questions as they arise throughout their visit. Diagnosis and Disposition       3:39 PM  Discussed with attending who has seen and evaluated patient. Will admit for diuresis for likely CHF. Awaiting CTA chest. Duplex RLE negative.     4:21 PM  Oxygen sats dropped to 90%.  Will start on 2L oxygen and admit    4:21 PM Consult: I discussed care with Guanaco Soto MD (on call medicine) It was a standard discussion, including history of patients chief complaint, available diagnostic results, and treatment course. Admit to tele for new onset CHF/pulmonary edema with hypoxia and underlying severe hypothyroidism. Ninoska Porras PA-C      1. Acute pulmonary edema (HCC)    2. OLIVIA (acute kidney injury) ([facility]    3. Severe hypothyroidism    4. Hypoxia    5. Right-sided congestive heart failure, unspecified HF chronicity ([facility]        PLAN:  1. ADMIT to tele  _______________________________    Attestations: This note is prepared by Ninoska Porras PA-C.  _______________________________    CRITICAL CARE NOTE:  4:22 PM  I have spent 37 minutes of critical care time involved in lab review, consultations with specialist, family decision-making, and documentation. During this entire length of time I was immediately available to the patient. Critical Care: The reason for providing this level of medical care for this critically ill patient was due a critical illness that impaired one or more vital organ systems such that there was a high probability of imminent or life threatening deterioration in the patients condition. This care involved high complexity decision making to assess, manipulate, and support vital system functions, to treat this degreee vital organ system failure and to prevent further life threatening deterioration of the patients condition. Please note that this dictation was completed with [employer], the [employer] voice recognition software. Quite often unanticipated grammatical, syntax, homophones, and other interpretive errors are inadvertently transcribed by the computer software. Please disregard these errors. Please excuse any errors that have escaped final proofreading.

## 2020-09-18 ENCOUNTER — APPOINTMENT (OUTPATIENT)
Dept: NON INVASIVE DIAGNOSTICS | Age: 65
DRG: 291 | End: 2020-09-18
Attending: INTERNAL MEDICINE
Payer: MEDICARE

## 2020-09-18 ENCOUNTER — HOSPITAL ENCOUNTER (OUTPATIENT)
Dept: ULTRASOUND IMAGING | Age: 65
Discharge: HOME | DRG: 291 | End: 2020-09-18
Attending: INTERNAL MEDICINE
Payer: MEDICARE

## 2020-09-18 LAB
ALBUMIN FLD-MCNC: 2.3 G/DL
ALBUMIN SERPL-MCNC: 3.7 G/DL (ref 3.4–5)
ALBUMIN/GLOB SERPL: 1.1 {RATIO} (ref 0.8–1.7)
ALP SERPL-CCNC: 28 U/L (ref 45–117)
ALT SERPL-CCNC: 27 U/L (ref 16–61)
AMMONIA PLAS-SCNC: 10 UMOL/L (ref 11–32)
ANION GAP SERPL CALC-SCNC: 6 MMOL/L (ref 3–18)
AST SERPL-CCNC: 65 U/L (ref 10–38)
ATRIAL RATE: 84 BPM
BASOPHILS # BLD: 0 K/UL (ref 0–0.1)
BASOPHILS NFR BLD: 0 % (ref 0–2)
BILIRUB SERPL-MCNC: 0.9 MG/DL (ref 0.2–1)
BUN SERPL-MCNC: 17 MG/DL (ref 7–18)
BUN/CREAT SERPL: 10 (ref 12–20)
CALCIUM SERPL-MCNC: 8.8 MG/DL (ref 8.5–10.1)
CALCULATED P AXIS, ECG09: 60 DEGREES
CALCULATED R AXIS, ECG10: 18 DEGREES
CALCULATED T AXIS, ECG11: 50 DEGREES
CHLORIDE SERPL-SCNC: 104 MMOL/L (ref 100–111)
CK MB CFR SERPL CALC: 0.9 % (ref 0–4)
CK MB CFR SERPL CALC: 1 % (ref 0–4)
CK MB SERPL-MCNC: 15.2 NG/ML (ref 5–25)
CK MB SERPL-MCNC: 19.9 NG/ML (ref 5–25)
CK SERPL-CCNC: 1701 U/L (ref 39–308)
CK SERPL-CCNC: 1943 U/L (ref 39–308)
CO2 SERPL-SCNC: 32 MMOL/L (ref 21–32)
CREAT SERPL-MCNC: 1.7 MG/DL (ref 0.6–1.3)
DIAGNOSIS, 93000: NORMAL
DIFFERENTIAL METHOD BLD: ABNORMAL
EOSINOPHIL # BLD: 0 K/UL (ref 0–0.4)
EOSINOPHIL NFR BLD: 0 % (ref 0–5)
ERYTHROCYTE [DISTWIDTH] IN BLOOD BY AUTOMATED COUNT: 16.5 % (ref 11.6–14.5)
GLOBULIN SER CALC-MCNC: 3.5 G/DL (ref 2–4)
GLUCOSE SERPL-MCNC: 74 MG/DL (ref 74–99)
HCT VFR BLD AUTO: 41.6 % (ref 36–48)
HGB BLD-MCNC: 13.7 G/DL (ref 13–16)
INR PPP: 1.1 (ref 0.8–1.2)
LDH FLD L TO P-CCNC: 237 U/L
LYMPHOCYTES # BLD: 1.2 K/UL (ref 0.9–3.6)
LYMPHOCYTES NFR BLD: 12 % (ref 21–52)
MAGNESIUM SERPL-MCNC: 2.4 MG/DL (ref 1.6–2.6)
MCH RBC QN AUTO: 31.8 PG (ref 24–34)
MCHC RBC AUTO-ENTMCNC: 32.9 G/DL (ref 31–37)
MCV RBC AUTO: 96.5 FL (ref 74–97)
MONOCYTES # BLD: 0.6 K/UL (ref 0.05–1.2)
MONOCYTES NFR BLD: 6 % (ref 3–10)
NEUTS SEG # BLD: 8.5 K/UL (ref 1.8–8)
NEUTS SEG NFR BLD: 82 % (ref 40–73)
P-R INTERVAL, ECG05: 150 MS
PLATELET # BLD AUTO: 228 K/UL (ref 135–420)
PMV BLD AUTO: 13.8 FL (ref 9.2–11.8)
POTASSIUM SERPL-SCNC: 3.4 MMOL/L (ref 3.5–5.5)
PROT SERPL-MCNC: 7.2 G/DL (ref 6.4–8.2)
PROTHROMBIN TIME: 14 SEC (ref 11.5–15.2)
Q-T INTERVAL, ECG07: 384 MS
QRS DURATION, ECG06: 94 MS
QTC CALCULATION (BEZET), ECG08: 453 MS
RBC # BLD AUTO: 4.31 M/UL (ref 4.7–5.5)
SODIUM SERPL-SCNC: 142 MMOL/L (ref 136–145)
SPECIMEN SOURCE FLD: NORMAL
SPECIMEN SOURCE FLD: NORMAL
TRIGL FLD-MCNC: 33 MG/DL
TROPONIN I SERPL-MCNC: 0.06 NG/ML (ref 0–0.04)
TROPONIN I SERPL-MCNC: 0.06 NG/ML (ref 0–0.04)
VENTRICULAR RATE, ECG03: 84 BPM
WBC # BLD AUTO: 10.3 K/UL (ref 4.6–13.2)

## 2020-09-18 PROCEDURE — 87635 SARS-COV-2 COVID-19 AMP PRB: CPT

## 2020-09-18 PROCEDURE — 36415 COLL VENOUS BLD VENIPUNCTURE: CPT

## 2020-09-18 PROCEDURE — 85610 PROTHROMBIN TIME: CPT

## 2020-09-18 PROCEDURE — 74011000250 HC RX REV CODE- 250: Performed by: INTERNAL MEDICINE

## 2020-09-18 PROCEDURE — 77010033678 HC OXYGEN DAILY

## 2020-09-18 PROCEDURE — 97530 THERAPEUTIC ACTIVITIES: CPT

## 2020-09-18 PROCEDURE — 85025 COMPLETE CBC W/AUTO DIFF WBC: CPT

## 2020-09-18 PROCEDURE — 83735 ASSAY OF MAGNESIUM: CPT

## 2020-09-18 PROCEDURE — 74011250636 HC RX REV CODE- 250/636: Performed by: HOSPITALIST

## 2020-09-18 PROCEDURE — 65660000000 HC RM CCU STEPDOWN

## 2020-09-18 PROCEDURE — 49083 ABD PARACENTESIS W/IMAGING: CPT

## 2020-09-18 PROCEDURE — P9047 ALBUMIN (HUMAN), 25%, 50ML: HCPCS | Performed by: INTERNAL MEDICINE

## 2020-09-18 PROCEDURE — 74011250637 HC RX REV CODE- 250/637: Performed by: HOSPITALIST

## 2020-09-18 PROCEDURE — 82550 ASSAY OF CK (CPK): CPT

## 2020-09-18 PROCEDURE — 74011250636 HC RX REV CODE- 250/636: Performed by: INTERNAL MEDICINE

## 2020-09-18 PROCEDURE — 93306 TTE W/DOPPLER COMPLETE: CPT

## 2020-09-18 PROCEDURE — 80053 COMPREHEN METABOLIC PANEL: CPT

## 2020-09-18 PROCEDURE — 82533 TOTAL CORTISOL: CPT

## 2020-09-18 PROCEDURE — C9113 INJ PANTOPRAZOLE SODIUM, VIA: HCPCS | Performed by: INTERNAL MEDICINE

## 2020-09-18 PROCEDURE — 97162 PT EVAL MOD COMPLEX 30 MIN: CPT

## 2020-09-18 PROCEDURE — 0W9G3ZZ DRAINAGE OF PERITONEAL CAVITY, PERCUTANEOUS APPROACH: ICD-10-PCS | Performed by: RADIOLOGY

## 2020-09-18 PROCEDURE — 82140 ASSAY OF AMMONIA: CPT

## 2020-09-18 RX ORDER — LIDOCAINE HYDROCHLORIDE 10 MG/ML
10 INJECTION, SOLUTION EPIDURAL; INFILTRATION; INTRACAUDAL; PERINEURAL
Status: COMPLETED | OUTPATIENT
Start: 2020-09-18 | End: 2020-09-18

## 2020-09-18 RX ORDER — RISPERIDONE 0.25 MG/1
0.5 TABLET, FILM COATED ORAL
Status: DISCONTINUED | OUTPATIENT
Start: 2020-09-18 | End: 2020-09-21

## 2020-09-18 RX ORDER — POTASSIUM CHLORIDE 20 MEQ/1
20 TABLET, EXTENDED RELEASE ORAL 2 TIMES DAILY
Status: COMPLETED | OUTPATIENT
Start: 2020-09-18 | End: 2020-09-19

## 2020-09-18 RX ORDER — FUROSEMIDE 10 MG/ML
20 INJECTION INTRAMUSCULAR; INTRAVENOUS 2 TIMES DAILY
Status: DISCONTINUED | OUTPATIENT
Start: 2020-09-18 | End: 2020-09-21

## 2020-09-18 RX ADMIN — Medication 10 ML: at 10:29

## 2020-09-18 RX ADMIN — ALBUMIN (HUMAN) 12.5 G: 0.25 INJECTION, SOLUTION INTRAVENOUS at 02:39

## 2020-09-18 RX ADMIN — ALBUMIN (HUMAN) 12.5 G: 0.25 INJECTION, SOLUTION INTRAVENOUS at 10:28

## 2020-09-18 RX ADMIN — FUROSEMIDE 40 MG: 10 INJECTION, SOLUTION INTRAMUSCULAR; INTRAVENOUS at 10:28

## 2020-09-18 RX ADMIN — PANTOPRAZOLE SODIUM 40 MG: 40 INJECTION, POWDER, FOR SOLUTION INTRAVENOUS at 21:08

## 2020-09-18 RX ADMIN — FUROSEMIDE 20 MG: 10 INJECTION, SOLUTION INTRAMUSCULAR; INTRAVENOUS at 21:09

## 2020-09-18 RX ADMIN — ALBUMIN (HUMAN) 12.5 G: 0.25 INJECTION, SOLUTION INTRAVENOUS at 17:35

## 2020-09-18 RX ADMIN — ALBUMIN (HUMAN) 12.5 G: 0.25 INJECTION, SOLUTION INTRAVENOUS at 21:09

## 2020-09-18 RX ADMIN — ENOXAPARIN SODIUM 40 MG: 40 INJECTION SUBCUTANEOUS at 10:28

## 2020-09-18 RX ADMIN — POTASSIUM CHLORIDE 20 MEQ: 1500 TABLET, EXTENDED RELEASE ORAL at 13:12

## 2020-09-18 RX ADMIN — Medication 10 ML: at 21:10

## 2020-09-18 RX ADMIN — POTASSIUM CHLORIDE 20 MEQ: 1500 TABLET, EXTENDED RELEASE ORAL at 21:08

## 2020-09-18 RX ADMIN — LIDOCAINE HYDROCHLORIDE ANHYDROUS 10 ML: 10 INJECTION, SOLUTION INFILTRATION at 14:52

## 2020-09-18 RX ADMIN — RISPERIDONE 0.5 MG: 0.25 TABLET, FILM COATED ORAL at 21:08

## 2020-09-18 NOTE — PROGRESS NOTES
Problem: Falls - Risk of  Goal: *Absence of Falls  Description: Document Torrey Lamb Fall Risk and appropriate interventions in the flowsheet.   9/18/2020 0420 by Mo Saunders  Outcome: Progressing Towards Goal  Note: Fall Risk Interventions:  Mobility Interventions: Communicate number of staff needed for ambulation/transfer         Medication Interventions: Patient to call before getting OOB    Elimination Interventions: Urinal in reach, Call light in reach           9/18/2020 0420 by Mo Saunders  Outcome: Progressing Towards Goal  Note: Fall Risk Interventions:

## 2020-09-18 NOTE — PROGRESS NOTES
Hospitalist Progress Note    Patient: Jackelyn Snow MRN: [id number]  CSN: [id number]    YOB: 1955  Age: 60 y.o. 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, treated in 2015  Left BKA  Hx of schizophrenia  Severe hypothyroidism  Hx thyroid cancer removed 2018  Mild rhabdo possibly due to endocrinologic state  OLIVIA versus underlying CKD, reduce lasix dose BID    Continue lasix  PO K repletion  Daily BMP  lovenox for DVT prophylaxis, right leg US was neg  For para today if enough fluid to tap    Labs reviewed    Echo report pending  Continue synthroid, no evidence for myxedema signs or coma    Due to poor living situation lately, we need case mgmt on his case  Add low dose risperdal at night      Disposition :  Patient Active Problem List   Diagnosis Code    Chronic hepatitis C (HCC) B18.2    [facility] Z89.519    Paranoid schizophrenia in remission ([facility] F20.0    Severe hypothyroidism E03.8    Pulmonary edema J81.1       Subjective:    I feel much better today  Less SOB  No chest pains  Denies nausea/vomiting    Review of systems:    Constitutional: denies fevers, chills  Respiratory: denies SOB, cough  Cardiovascular: denies chest pain, palpitations  Gastrointestinal: denies nausea, vomiting, diarrhea      Vital signs/Intake and Output:  Visit Vitals  /67   Pulse 66   Temp 97.6 °F (36.4 °C)   Resp 21   Wt 57.5 kg (126 lb 12.8 oz)   SpO2 100%   BMI 23.19 kg/m²     Current Shift:  No intake/output data recorded.   Last three shifts:  09/16 1901 - 09/18 0700  In: 500 [I.V.:500]  Out: 2150 [Urine:2150]    Exam:    General: debilitated thin  male, NAD  Head/Neck: NCAT, supple, No masses, No lymphadenopathy  CVS:Regular rate and rhythm, no M/R/G, S1/S2 heard, no thrill  Lungs:Clear to auscultation bilaterally, no wheezes, rhonchi, or rales  Abdomen: Soft, Nontender, No distention, Normal Bowel sounds, No hepatomegaly  Extremities: left BKA  Neuro:grossly normal , follows commands  Psych:appropriate                Labs: Results:       Chemistry Recent Labs     09/18/20  0048 09/17/20  1332   GLU 74 95    143   K 3.4* 3.3*    109   CO2 32 27   BUN 17 19*   CREA 1.70* 1.89*   CA 8.8 8.3*   AGAP 6 7   BUCR 10* 10*   AP 28* 26*   TP 7.2 6.4   ALB 3.7 3.3*   GLOB 3.5 3.1   AGRAT 1.1 1.1      CBC w/Diff Recent Labs     09/18/20  0048 09/17/20  1332   WBC 10.3 7.2   RBC 4.31* 3.77*   HGB 13.7 12.1*   HCT 41.6 35.8*    213   GRANS 82* 72   LYMPH 12* 23   EOS 0 0      Cardiac Enzymes Recent Labs     09/18/20  0625 09/18/20  0048   CPK 1,701* 1,943*   CKND1 0.9 1.0      Coagulation Recent Labs     09/18/20  0048 09/17/20  1332   PTP 14.0 14.3   INR 1.1 1.1   APTT  --  31.1       Lipid Panel No results found for: CHOL, CHOLPOCT, CHOLX, CHLST, CHOLV, 823017, HDL, HDLP, LDL, LDLC, DLDLP, 134780, VLDLC, VLDL, TGLX, TRIGL, TRIGP, TGLPOCT, CHHD, CHHDX   BNP No results for input(s): BNPP in the last 72 hours.    Liver Enzymes Recent Labs     09/18/20  0048   TP 7.2   ALB 3.7   AP 28*      Thyroid Studies Lab Results   Component Value Date/Time    .00 (H) 09/17/2020 01:32 PM        Procedures/imaging: see electronic medical records for all procedures/Xrays and details which were not copied into this note but were reviewed prior to creation of Malaika Ruiz MD

## 2020-09-18 NOTE — ROUTINE PROCESS
LLQ PARACENTESIS WAS PERFORMED. PATIENT TOLERATED PROCEDURE WELL. 1300 CC OF ASCITES FLUID WAS DRAINED. SAMPLE TAKEN TO LAB FOR TESTING. PATIENT WAS TAKEN TO ROOM BY TRANSPORTATION. PATIENT LEFT IN STABLE CONDITION.

## 2020-09-18 NOTE — PROGRESS NOTES
Physician Progress Note      Aleena Dumont  CSN #:                  [id number]  :                       1955  ADMIT DATE:       2020 1:10 PM  [address] [place] DATE:  RESPONDING  PROVIDER #:        Alix Alva MD          QUERY TEXT:    Pt admitted with CHF . Pt noted to have hypoxia. If possible, please document in the progress notes and discharge summary if you are evaluating and/or treating any of the following: The medical record reflects the following:  Risk Factors: CHF    Clinical Indicators: PER ED documentation patient presented with decreased lung sounds, in the base bilaterally r with increased accessory muscles use and mild JVD, , tachypnea, acute pulmonary edema per ED. O2 sats 90 %, respirations 22-26    Treatment: O2 NC 2-3 L, Lasix iv  Thank- you  Isiah Pedersen RN -[phone]  Options provided:  -- Acute respiratory failure with hypoxia  -- Acute respiratory failure with hypoxia ruled out  -- Other - I will add my own diagnosis  -- Disagree - Not applicable / Not valid  -- Disagree - Clinically unable to determine / Unknown  -- Refer to Clinical Documentation Reviewer    PROVIDER RESPONSE TEXT:    This patient with acute respiratory failure with hypoxia ruled out.     Query created by: Larry Ng on 2020 11:31 AM      Electronically signed by:  Alix Alva MD 2020 11:37 AM

## 2020-09-18 NOTE — PROGRESS NOTES
Head to toe assessment completed at this time. Pt denies chest pain or SOB. Pt denies any numbness or tingling to extremities. Fall risk band in place. Pt educated on side effects of medication. Pt encouraged to manage pain and call for assistance. Pt left in bed with call light within reach, bed in low position and wheels locked. Will continue to monitor. 0230- SARs test complete    Shift Summ- Pt left in the room no signs of distress. Pt educated on side effects of medications . Pt pain has been 0/10. All Pt's concerns addressed.      [address] from Ultrasound called: Pt is able to eat this morning, procedure scheduled for 1430

## 2020-09-18 NOTE — PROGRESS NOTES
Bedside and Verbal shift change report given to Conrad Alonso (oncoming nurse) by Anabel Mcneal (offgoing nurse). Report included the following information SBAR, Kardex, Intake/Output, MAR and Recent Results.

## 2020-09-18 NOTE — PROCEDURES
Vascular & Interventional Radiology Brief Procedure Note    Interventional Radiologist: Kyle Calderon MD    Pre-operative Diagnosis:  ascites    Post-operative Diagnosis: Same as pre-op dx    Procedure(s) Performed:  same    Anesthesia:  Local and Moderate Sedation    Findings:  Uncomplicated LLQ para, final volume pending. Clear yellow fluid.      Complications: None    Estimated Blood Loss:  minimal    Tubes and Drains: None    Specimens: sent    Condition: Good     Kyle Calderon MD  [facility]  Vascular & Interventional Radiology  9/18/2020

## 2020-09-18 NOTE — PROGRESS NOTES
DC Plan: TBD    Chart reviewed. Pt admitted to tele by hospitalist.  Pt covid pending 9/18. Called into pt room to discuss dc plan, pt states he is eating lunch and asked for call back another time. CM will follow for transition of care needs [phone] and will be available via hospital  on weekends. Reason for Admission:   Per H&P pt is \"is a 60 y.o.  male who has history of thyroid cancer, cirrhosis, congestive heart failure paranoid schizophrenia,  status post above-knee amputation presents to the hospital with complaints of worsening lower extremity and dyspnea also patient has not been able to see his PCP for a few months and has not been on his medications. Patient has had the right leg swelling instead extending into his abdomen and genitals this is been progressive over the last 4 months. His sister who used to live with him recently moved out and he has become more short of breath and sent him to the emergency room for further evaluation. In the emergency room he was found to be volume overloaded he was given IV Lasix with improvement  Patient denies any COVID-19 exposures or recent travels a CTA was ordered to rule out pulmonary embolism due to acute kidney injury hydration was recommended his dyspnea worsened after hydration. TSH was found to be over 200\"                   RUR Score:    low                 Plan for utilizing home health:      TBD, Provider if [facility] needed please place order    PCP: First and Last name:  Listed as MD Johann Baxter   Name of Practice:    Are you a current patient: Yes/No:    Approximate date of last visit:    Can you participate in a virtual visit with your PCP:                     Current Advanced Directive/Advance Care Plan:                          Transition of Care Plan:    TBD

## 2020-09-18 NOTE — PROGRESS NOTES
Problem: Mobility Impaired (Adult and Pediatric)  Goal: *Acute Goals and Plan of Care (Insert Text)  Description: Physical Therapy Goals   Initiated 9/18/2020 and to be accomplished within 3-5 day(s)  1. Patient will move from supine <> sit with S in prep for out of bed activity and change of position. 2.  Patient will perform sit<> stand with S with LRAD and prosthetic Lower limb in place in prep for transfers/ambulation. 3.  Patient will transfer from bed <> chair with S  with LRAD and prosthetic Lower limb in place for time up in chair for completion of ADL activity. 4.  Patient will ambulate 50 feet with S/LRAD and prosthetic Lower limb in place for improved functional mobility at discharge. 5.  Patient will ascend/descend 3-5 stairs with handrail(s) with minimal assistance/contact guard assist for home re-entry as needed. Outcome: Progressing Towards Goal  PHYSICAL THERAPY TREATMENT    Patient: Olga De (61 y.o. male)  Date: 9/18/2020  Diagnosis: Pulmonary edema [J81.1]  Severe hypothyroidism [E03.8]   <principal problem not specified>  Precautions: Fall, Other (comment)(Prosthetic L Lower Limb)   Chart, physical therapy assessment, plan of care and goals were reviewed. ASSESSMENT:  Pt remains up in chair on PT arrival and eager to return to bed. Pt completes SPT with mod A toward R side and sit >supine with SBA. Able to roll with mod I for linen and gown adjustment. Scoots self up in bed with SBA. Pt left with all needs in reach and nurse [facility] made aware. Pt for paracentesis this after. Will continue per POC. Note pt expresses desire to return home at discharge and feels he will be ambulatory with prosthetic limb \"with my pants on\".   Progression toward goals:  [x]      Improving appropriately and progressing toward goals  []      Improving slowly and progressing toward goals  []      Not making progress toward goals and plan of care will be adjusted     PLAN:  Patient continues to benefit from skilled intervention to address the above impairments. Continue treatment per established plan of care. Discharge Recommendations:  Home Health or TBD  Further Equipment Recommendations for Discharge:  Prosthetist service L LE prosthesis     SUBJECTIVE:   Patient stated Neida Gillette I get back in bed now?     OBJECTIVE DATA SUMMARY:   Critical Behavior:  Neurologic State: Alert, Appropriate for age  Orientation Level: Oriented X4  Cognition: Follows commands  Safety/Judgement: Awareness of environment, Fall prevention  Functional Mobility Training:  Bed Mobility:  Sit to Supine: Stand-by assistance  Scooting: Stand-by assistance  Transfers:  Sit to Stand: Minimum assistance  Stand to Sit: Minimum assistance  Bed to Chair: Moderate assistance  Balance:  Sitting: Intact  Standing: Impaired; With support  Standing - Static: Fair  Standing - Dynamic : Fair  Pain:  Pain Scale 1: Numeric (0 - 10)  Pain Intensity 1: 0  Activity Tolerance:   Fair   Please refer to the flowsheet for vital signs taken during this treatment.   After treatment:   [] Patient left in no apparent distress sitting up in chair  [x] Patient left in no apparent distress in bed  [x] Call bell left within reach  [x] Nursing notified-[place]  [] Caregiver present  [] Bed alarm activated      Dontae Yu PT   Time Calculation: 25 mins

## 2020-09-18 NOTE — PROGRESS NOTES
Problem: Mobility Impaired (Adult and Pediatric)  Goal: *Acute Goals and Plan of Care (Insert Text)  Description: Physical Therapy Goals   Initiated 9/18/2020 and to be accomplished within 3-5 day(s)  1. Patient will move from supine <> sit with S in prep for out of bed activity and change of position. 2.  Patient will perform sit<> stand with S with LRAD and prosthetic Lower limb in place in prep for transfers/ambulation. 3.  Patient will transfer from bed <> chair with S  with LRAD and prosthetic Lower limb in place for time up in chair for completion of ADL activity. 4.  Patient will ambulate 50 feet with S/LRAD and prosthetic Lower limb in place for improved functional mobility at discharge. 5.  Patient will ascend/descend 3-5 stairs with handrail(s) with minimal assistance/contact guard assist for home re-entry as needed. Outcome: Progressing Towards Goal  PHYSICAL THERAPY EVALUATION    Patient: Shelby Yost (66 y.o. male)  Date: 9/18/2020  Primary Diagnosis: Pulmonary edema [J81.1]  Severe hypothyroidism [E03.8]  Precautions:Fall, Other (comment)(Prosthetic L Lower Limb)    ASSESSMENT :  Based on the objective data described below, the patient is a 59 yo M  seen on telemetry unit following admission for diagnosis as noted above. Pt found supine in bed and presents with L AKA and prosthetic limb in place on in bed; O2 in place and IV. Pt residual limb not anchored into prosthesis socket; prosthesis with masking tape around distal socket. Pt states his pants keep prosthesis in place. Appears pt has lost wt since receiving prosthesis in 2010 (has had 2 prosthesis prior). Prosthesis removed; residual limb w/o covering. Pt able to transition to sit EOB with CGA/SBA for SPT. Pt left up in chair with all needs in reach and lunch tray in front of pt. Pt reports amb with prosthesis and RW and lives alone in lower level of 2 story home PTA.  States his sister lived with him previously, but recently moved out. Pt current prosthesis in need of servicing from prosthetist ([address] by pt report). Will benefit from skilled PT to address goals above and resume prior level of functional independence for return home at discharge. No pain reported at this time. Pt Education: Role of physical therapy in acute care setting, fall prevention and safety/technique during functional mobility tasks      Patient will benefit from skilled intervention to address the above impairments. Patients rehabilitation potential is considered to be Fair  Factors which may influence rehabilitation potential include:   []         None noted  []         Mental ability/status  [x]         Medical condition  [x]         Home/family situation and support systems  []         Safety awareness  []         Pain tolerance/management  []         Other:      PLAN :  Recommendations and Planned Interventions:  [x]           Bed Mobility Training             []    Neuromuscular Re-Education  [x]           Transfer Training                   []    Orthotic/Prosthetic Training  [x]           Gait Training                          []    Modalities  [x]           Therapeutic Exercises          []    Edema Management/Control  [x]           Therapeutic Activities            [x]    Patient and Family Training/Education  []           Other (comment):    Frequency/Duration: Patient will be followed by physical therapy 1-2 times per day to address goals. Discharge Recommendations: Home Health or TBD  Further Equipment Recommendations for Discharge: Pt in need of prosthetist to address L LE prosthesis as pt cannot remember when it has been serviced. SUBJECTIVE:   Patient stated I was retaining water and with congestive heart failure.     OBJECTIVE DATA SUMMARY:     Past Medical History:   Diagnosis Date    Cancer [facility]) 2011    Thyroid    Psychiatric disorder     paranoid schizophrenia     Thyroid disease     hypo     Past Surgical History: Procedure Laterality Date    HX GI  2018    EGD    HX ORTHOPAEDIC      amputation left- above the knee    HX OTHER SURGICAL      thyroid removal- radioactive iodine treatment    HX OTHER SURGICAL Left     above the knee amputation from a 100 ft fall    HX THYROIDECTOMY  2008     Barriers to Learning/Limitations: yes;  physical  Compensate with: Verbal Cues  Prior Level of Function/Home Situation: amb with prosthesis and RW per pt report  Home Situation  Home Environment: Private residence  One/Two Story Residence: Two story, live on 1st floor  Living Alone: Yes  Support Systems: Family member(s)  Patient Expects to be Discharged to[de-identified] Private residence  Current DME Used/Available at Home: Walker, rolling  Critical Behavior:  Neurologic State: Alert; Appropriate for age  Orientation Level: Oriented X4  Cognition: Follows commands  Safety/Judgement: Awareness of environment; Fall prevention  Psychosocial  Patient Behaviors: Calm; Cooperative  Purposeful Interaction: Yes  Pt Identified Daily Priority: Clinical issues (comment)  Caritas Process: Nurture loving kindness;Establish trust;Teaching/learning; Attend basic human needs  Caring Interventions: Reassure  Reassure: Informing  Skin Integrity: Scars (comment); Excoriation  Skin Integumentary  Skin Integrity: Scars (comment); Excoriation  Strength:    Strength: Generally decreased, functional  Tone & Sensation:   Sensation: Intact  Range Of Motion:  AROM: Generally decreased, functional  Functional Mobility:  Bed Mobility:  Supine to Sit: Contact guard assistance;Stand-by assistance  Scooting: Contact guard assistance;Stand-by assistance  Transfers:  Sit to Stand: Moderate assistance;Assist x1(for SPT bed to chair)  Stand to Sit: Moderate assistance;Assist x1  Bed to Chair: Moderate assistance  Balance:   Sitting: Intact  Standing: Impaired; With support  Standing - Static: Fair  Standing - Dynamic : Fair  Pain:  Pain Scale 1: Numeric (0 - 10)  Pain Intensity 1: 0  Activity Tolerance:   Fair   Please refer to the flowsheet for vital signs taken during this treatment. After treatment:   [x]         Patient left in no apparent distress sitting up in chair  []         Patient left in no apparent distress in bed  [x]         Call bell left within reach  [x]         Nursing notified  []         Caregiver present  []         Bed alarm activated    COMMUNICATION/EDUCATION:   [x]         Fall prevention education was provided and the patient/caregiver indicated understanding. [x]         Patient/family have participated as able in goal setting and plan of care. [x]         Patient/family agree to work toward stated goals and plan of care. []         Patient understands intent and goals of therapy, but is neutral about his/her participation. []         Patient is unable to participate in goal setting and plan of care.     Eval Complexity: History: HIGH Complexity :3+ comorbidities / personal factors will impact the outcome/ POC Exam:LOW Complexity : 1-2 Standardized tests and measures addressing body structure, function, activity limitation and / or participation in recreation  Presentation: MEDIUM Complexity : Evolving with changing characteristics  Clinical Decision Making:Medium Complexity  Overall Complexity:MEDIUM    Thank you for this referral.  Juliet Monday, PT   Time Calculation: 23 mins

## 2020-09-18 NOTE — ROUTINE PROCESS
Bedside and Verbal shift change report given to Michelle Fletcher RN (oncoming nurse) by [place] Gerardo RN (offgoing nurse). Report included the following information SBAR, Kardex, Procedure Summary, Intake/Output, MAR and Recent Results.

## 2020-09-18 NOTE — H&P
The patient is an appropriate candidate to undergo paracentesis. Patient assessed immediately prior to induction. Anesthesia plan as follows: Local/No Anesthesia. History and Physical update:  H&P was reviewed and the patient was examined. No changes have occurred in the patient's condition since the H&P was completed.     Alyson Gutiérrez MD  Vascular & Interventional Radiology  [facility]  9/18/2020

## 2020-09-18 NOTE — PROGRESS NOTES
Comprehensive Nutrition Assessment    Type and Reason for Visit: Initial    Nutrition Recommendations/Plan Supplements: will monitor intake; if intake remains >50% at most meals will not add supplement; if intake changes would recc to add Ensure Compact    Nutrition Assessment:  pt admitted w/ SOB, leg swelling, schedule for paracentesis today per nurse in rounds    Malnutrition Assessment:  Malnutrition Status:  (P) Severe malnutrition    Context:  (P) Chronic illness     Findings of the 6 clinical characteristics of malnutrition:   Energy Intake:  (P) No significant decrease in energy intake  Weight Loss:  (P) Unable to assess     Body Fat Loss:  (P) 1 - Mild body fat loss, (P) Orbital, Triceps   Muscle Mass Loss:  (P) 7 - Severe muscle mass loss, (P) Temples (temporalis), Clavicles (pectoralis &deltoids)  Fluid Accumulation:  (P) 7 - Severe, (P) Ascites   Strength:  (P) Not performed       Estimated Daily Nutrient Needs:  Energy (kcal):  1725-2013  Protein (g):  46-58       Fluid (ml/day):  1725-2013    Nutrition Related Findings:  +BM 9/17/20 K-3.4 GFR est AA-49 Meds: Lasix, protonix, synthroid, miralax,      Wounds:    None       Current Nutrition Therapies:   DIET CARDIAC Regular    Anthropometric Measures:  · Height:  5' 2\" (157.5 cm)  · Current Body Wt:  57.2 kg (126 lb)   · Admission Body Wt:       · Usual Body Wt:  120 lb(5/21/2018)     · Ideal Body Wt:  118 lbs:  106.8 %   · Adjusted Body Weight:  138.7; Weight Adjustment for: Amputation   · Adjusted BMI:  25.3    · BMI Category:  Normal weight (BMI 18.5-24. 9)       Nutrition Diagnosis:   · Severe malnutrition, In context of chronic illness related to catabolic illness as evidenced by severe muscle loss, severe loss of subcutaneous fat, localized or generalized fluid accumulation    Nutrition Interventions:   Food and/or Nutrient Delivery: Continue current diet, Start oral nutrition supplement, Vitamin supplement, Mineral supplement  Nutrition Education and Counseling: No recommendations at this time  Coordination of Nutrition Care: Continued inpatient monitoring, Coordination of community care    Goals:  Encourage PO intake >50% at most meals in the next 3-5days       Nutrition Monitoring and Evaluation:   Behavioral-Environmental Outcomes:    Food/Nutrient Intake Outcomes: Diet advancement/tolerance, Food and nutrient intake, Supplement intake, Vitamin/mineral intake  Physical Signs/Symptoms Outcomes: Biochemical data, Weight, Skin, GI status, Nutrition focused physical findings    Discharge Planning:    Continue current diet     Electronically signed by Mervin De Leon on 9/18/2020 at 11:49 AM

## 2020-09-19 PROBLEM — I50.43 ACUTE ON CHRONIC COMBINED SYSTOLIC AND DIASTOLIC CHF, NYHA CLASS 4 (HCC): Status: ACTIVE | Noted: 2020-09-19

## 2020-09-19 LAB
ALBUMIN SERPL-MCNC: 3.8 G/DL (ref 3.4–5)
ALBUMIN/GLOB SERPL: 1.5 {RATIO} (ref 0.8–1.7)
ALP SERPL-CCNC: 22 U/L (ref 45–117)
ALT SERPL-CCNC: 21 U/L (ref 16–61)
AMMONIA PLAS-SCNC: 15 UMOL/L (ref 11–32)
ANION GAP SERPL CALC-SCNC: 6 MMOL/L (ref 3–18)
AST SERPL-CCNC: 54 U/L (ref 10–38)
AV VELOCITY RATIO: 0.6
AV VTI RATIO: 0.4
BASOPHILS # BLD: 0 K/UL (ref 0–0.1)
BASOPHILS NFR BLD: 0 % (ref 0–2)
BILIRUB SERPL-MCNC: 0.9 MG/DL (ref 0.2–1)
BUN SERPL-MCNC: 18 MG/DL (ref 7–18)
BUN/CREAT SERPL: 10 (ref 12–20)
CALCIUM SERPL-MCNC: 8.7 MG/DL (ref 8.5–10.1)
CHLORIDE SERPL-SCNC: 103 MMOL/L (ref 100–111)
CO2 SERPL-SCNC: 30 MMOL/L (ref 21–32)
CORTIS SERPL-MCNC: 17 UG/DL
CREAT SERPL-MCNC: 1.73 MG/DL (ref 0.6–1.3)
DIFFERENTIAL METHOD BLD: ABNORMAL
ECHO AO ASC DIAM: 3.18 CM
ECHO AV ANNULUS DIAM: 3.05 CM
ECHO AV AREA PEAK VELOCITY: 1.9 CM2
ECHO AV AREA VTI: 1.3 CM2
ECHO AV AREA/BSA PEAK VELOCITY: 1.2 CM2/M2
ECHO AV AREA/BSA VTI: 0.8 CM2/M2
ECHO AV MEAN GRADIENT: 1 MMHG
ECHO AV MEAN VELOCITY: 0.48 M/S
ECHO AV PEAK GRADIENT: 1.8 MMHG
ECHO AV PEAK VELOCITY: 66.88 CM/S
ECHO AV VTI: 15.86 CM
ECHO IVC PROX: 1.7 CM
ECHO LA AREA 2C: 19.17 CM2
ECHO LA AREA 4C: 15.5 CM2
ECHO LA MAJOR AXIS: 3.66 CM
ECHO LA MINOR AXIS: 2.33 CM
ECHO LA VOL 2C: 54.6 ML (ref 18–58)
ECHO LA VOL 4C: 35.72 ML (ref 18–58)
ECHO LA VOL BP: 51.52 ML (ref 18–58)
ECHO LA VOLUME INDEX A2C: 34.76 ML/M2 (ref 16–28)
ECHO LA VOLUME INDEX A4C: 22.74 ML/M2 (ref 16–28)
ECHO LV E' LATERAL VELOCITY: 7 CM/S
ECHO LV E' SEPTAL VELOCITY: 5 CM/S
ECHO LV EDV A2C: 161.1 ML
ECHO LV EDV A4C: 98.3 ML
ECHO LV EDV BP: 127.5 ML (ref 67–155)
ECHO LV EDV INDEX A4C: 62.6 ML/M2
ECHO LV EDV INDEX BP: 81.2 ML/M2
ECHO LV EDV NDEX A2C: 102.6 ML/M2
ECHO LV EDV TEICHHOLZ: 0.74 ML
ECHO LV EJECTION FRACTION A2C: 28 %
ECHO LV EJECTION FRACTION A4C: 24 %
ECHO LV EJECTION FRACTION BIPLANE: 24.8 % (ref 55–100)
ECHO LV ESV A2C: 116.3 ML
ECHO LV ESV A4C: 74.7 ML
ECHO LV ESV BP: 95.9 ML (ref 22–58)
ECHO LV ESV INDEX A2C: 74 ML/M2
ECHO LV ESV INDEX A4C: 47.6 ML/M2
ECHO LV ESV INDEX BP: 61.1 ML/M2
ECHO LV ESV TEICHHOLZ: 0.53 ML
ECHO LV INTERNAL DIMENSION DIASTOLIC: 4.98 CM (ref 4.2–5.9)
ECHO LV INTERNAL DIMENSION SYSTOLIC: 4.32 CM
ECHO LV IVSD: 0.85 CM (ref 0.6–1)
ECHO LV MASS 2D: 152.6 G (ref 88–224)
ECHO LV MASS INDEX 2D: 97.1 G/M2 (ref 49–115)
ECHO LV POSTERIOR WALL DIASTOLIC: 0.91 CM (ref 0.6–1)
ECHO LV POSTERIOR WALL SYSTOLIC: 0 CM
ECHO LVOT CARDIAC OUTPUT: 1.14 L/MIN
ECHO LVOT DIAM: 2 CM
ECHO LVOT PEAK GRADIENT: 0.6 MMHG
ECHO LVOT PEAK VELOCITY: 39.82 CM/S
ECHO LVOT SV: 20.8 ML
ECHO LVOT VTI: 6.63 CM
ECHO MV A VELOCITY: 24.49 CM/S
ECHO MV AREA PHT: 5.4 CM2
ECHO MV E DECELERATION TIME (DT): 139.3 MS
ECHO MV E VELOCITY: 76.96 CM/S
ECHO MV E/A RATIO: 3.14
ECHO MV E/E' LATERAL: 10.99
ECHO MV E/E' RATIO (AVERAGED): 13.19
ECHO MV E/E' SEPTAL: 15.39
ECHO MV MEAN INFLOW VELOCITY: 3 M/S
ECHO MV PRESSURE HALF TIME (PHT): 40.4 MS
ECHO MV REGURGITANT PEAK GRADIENT: ABNORMAL MMHG
ECHO MV REGURGITANT PEAK VELOCITY: 394.42 CM/S
ECHO MV REGURGITANT VTIA: 140.87 CM
ECHO PV PEAK INSTANTANEOUS GRADIENT SYSTOLIC: 7.85 MMHG
ECHO PV REGURGITANT MAX VELOCITY: 140 CM/S
ECHO PV REGURGITANT MAX VELOCITY: 394 CM/S
ECHO RA AREA 4C: 11.46 CM2
ECHO RA VOLUME: 25 ML
ECHO RV INTERNAL DIMENSION: 2.96 CM
ECHO TV MEAN GRADIENT: 39.73 MMHG
EOSINOPHIL # BLD: 0 K/UL (ref 0–0.4)
EOSINOPHIL NFR BLD: 1 % (ref 0–5)
ERYTHROCYTE [DISTWIDTH] IN BLOOD BY AUTOMATED COUNT: 16.7 % (ref 11.6–14.5)
GLOBULIN SER CALC-MCNC: 2.6 G/DL (ref 2–4)
GLUCOSE SERPL-MCNC: 78 MG/DL (ref 74–99)
HCT VFR BLD AUTO: 36.6 % (ref 36–48)
HGB BLD-MCNC: 11.7 G/DL (ref 13–16)
INR PPP: 1.2 (ref 0.8–1.2)
LVFS 2D: 13.21 %
LVOT MG: 0.33 MMHG
LVOT MV: 0.27 CM/S
LVSV (MOD BI): 21.32 ML
LVSV (MOD SINGLE 4C): 15.9 ML
LVSV (MOD SINGLE): 30.26 ML
LVSV (TEICH): 22.38 ML
LYMPHOCYTES # BLD: 1.8 K/UL (ref 0.9–3.6)
LYMPHOCYTES NFR BLD: 28 % (ref 21–52)
MAGNESIUM SERPL-MCNC: 2.4 MG/DL (ref 1.6–2.6)
MCH RBC QN AUTO: 31.7 PG (ref 24–34)
MCHC RBC AUTO-ENTMCNC: 32 G/DL (ref 31–37)
MCV RBC AUTO: 99.2 FL (ref 74–97)
MONOCYTES # BLD: 0.4 K/UL (ref 0.05–1.2)
MONOCYTES NFR BLD: 6 % (ref 3–10)
MV DEC SLOPE: 5.75
NEUTS SEG # BLD: 4.2 K/UL (ref 1.8–8)
NEUTS SEG NFR BLD: 65 % (ref 40–73)
PLATELET # BLD AUTO: 197 K/UL (ref 135–420)
PMV BLD AUTO: 14 FL (ref 9.2–11.8)
POTASSIUM SERPL-SCNC: 3.7 MMOL/L (ref 3.5–5.5)
PROT SERPL-MCNC: 6.4 G/DL (ref 6.4–8.2)
PROTHROMBIN TIME: 14.7 SEC (ref 11.5–15.2)
PV END DIASTOLIC VELOCITY: 1.4 MMHG
RBC # BLD AUTO: 3.69 M/UL (ref 4.7–5.5)
SARS-COV-2, COV2NT: NOT DETECTED
SODIUM SERPL-SCNC: 139 MMOL/L (ref 136–145)
SOURCE, COVRS: NORMAL
SPECIMEN TYPE, XMCV1T: NORMAL
WBC # BLD AUTO: 6.4 K/UL (ref 4.6–13.2)

## 2020-09-19 PROCEDURE — 83735 ASSAY OF MAGNESIUM: CPT

## 2020-09-19 PROCEDURE — 97116 GAIT TRAINING THERAPY: CPT

## 2020-09-19 PROCEDURE — 65660000000 HC RM CCU STEPDOWN

## 2020-09-19 PROCEDURE — C9113 INJ PANTOPRAZOLE SODIUM, VIA: HCPCS | Performed by: INTERNAL MEDICINE

## 2020-09-19 PROCEDURE — 74011250636 HC RX REV CODE- 250/636: Performed by: HOSPITALIST

## 2020-09-19 PROCEDURE — 85610 PROTHROMBIN TIME: CPT

## 2020-09-19 PROCEDURE — P9047 ALBUMIN (HUMAN), 25%, 50ML: HCPCS | Performed by: INTERNAL MEDICINE

## 2020-09-19 PROCEDURE — 82140 ASSAY OF AMMONIA: CPT

## 2020-09-19 PROCEDURE — 74011250637 HC RX REV CODE- 250/637: Performed by: INTERNAL MEDICINE

## 2020-09-19 PROCEDURE — 80053 COMPREHEN METABOLIC PANEL: CPT

## 2020-09-19 PROCEDURE — 74011000250 HC RX REV CODE- 250: Performed by: INTERNAL MEDICINE

## 2020-09-19 PROCEDURE — 85025 COMPLETE CBC W/AUTO DIFF WBC: CPT

## 2020-09-19 PROCEDURE — 74011250637 HC RX REV CODE- 250/637: Performed by: HOSPITALIST

## 2020-09-19 PROCEDURE — 97110 THERAPEUTIC EXERCISES: CPT

## 2020-09-19 PROCEDURE — 36415 COLL VENOUS BLD VENIPUNCTURE: CPT

## 2020-09-19 PROCEDURE — 74011250636 HC RX REV CODE- 250/636: Performed by: INTERNAL MEDICINE

## 2020-09-19 RX ADMIN — POTASSIUM CHLORIDE 20 MEQ: 1500 TABLET, EXTENDED RELEASE ORAL at 10:20

## 2020-09-19 RX ADMIN — RISPERIDONE 0.5 MG: 0.25 TABLET, FILM COATED ORAL at 21:26

## 2020-09-19 RX ADMIN — SODIUM CHLORIDE 40 MG: 9 INJECTION, SOLUTION INTRAMUSCULAR; INTRAVENOUS; SUBCUTANEOUS at 21:26

## 2020-09-19 RX ADMIN — ALBUMIN (HUMAN) 12.5 G: 0.25 INJECTION, SOLUTION INTRAVENOUS at 10:21

## 2020-09-19 RX ADMIN — ENOXAPARIN SODIUM 40 MG: 40 INJECTION SUBCUTANEOUS at 10:21

## 2020-09-19 RX ADMIN — LEVOTHYROXINE SODIUM 125 MCG: 0.03 TABLET ORAL at 10:20

## 2020-09-19 RX ADMIN — ALBUMIN (HUMAN) 12.5 G: 0.25 INJECTION, SOLUTION INTRAVENOUS at 03:48

## 2020-09-19 RX ADMIN — FUROSEMIDE 20 MG: 10 INJECTION, SOLUTION INTRAMUSCULAR; INTRAVENOUS at 21:27

## 2020-09-19 RX ADMIN — Medication 10 ML: at 10:22

## 2020-09-19 RX ADMIN — POTASSIUM CHLORIDE 20 MEQ: 1500 TABLET, EXTENDED RELEASE ORAL at 21:26

## 2020-09-19 RX ADMIN — FUROSEMIDE 20 MG: 10 INJECTION, SOLUTION INTRAMUSCULAR; INTRAVENOUS at 10:21

## 2020-09-19 NOTE — PROGRESS NOTES
Hospitalist Progress Note    Patient: Mai Farah MRN: [id number]  CSN: [id number]    YOB: 1955  Age: 60 y.o. Sex: male    DOA: 9/17/2020 LOS:  LOS: 2 days            Assessment/Plan     Active Problems:    Chronic hepatitis C ([facility] [address] (10/29/2014)      S/P BKA (below knee amputation) ([facility] [address] (10/29/2014)      Overview: Left      Paranoid schizophrenia, subchronic condition ([facility] [address] (10/29/2014)      Severe hypothyroidism (9/17/2020)      Pulmonary edema (9/17/2020)      Acute on chronic combined systolic and diastolic CHF, NYHA class 4 ([facility] [address] (9/19/2020)      Acute combined CHF exacerbation: I have reviewed echo report/EF of 97%/ systolic and diastolic CHF class IV. 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 Pending fluids results. OLIVIA versus underlying CKD, reduce lasix dose BID     Due to poor living situation lately, we need case mgmt on his case    Dispo: 2 to 3 days     CC:   Shortness of breath secondary to acute on chronic combined CHF, hep C cirrhosis, severe hypothyroidism, left BKA, history of schizophrenia         Subjective:     Pt was seen and examined with the nurse in the morning round. \"I am doing better\" less SOB, no CP. Review of systems  General: No fevers or chills. Cardiovascular: No chest pain or pressure. No palpitations. Pulmonary: + cough, SOB  Gastrointestinal: No nausea, vomiting.      Objective:      Visit Vitals  /72 (BP 1 Location: Right arm, BP Patient Position: At rest;Sitting)   Pulse 74   Temp 98.2 °F (36.8 °C)   Resp 20   Ht 5' 2\" (1.575 m)   Wt 49.8 kg (109 lb 11.2 oz)   SpO2 97%   BMI 20.06 kg/m²       Physical Exam:    Gen: NAD, frail. Patricia Nims Heent:  MMM, NC, AT. Cor: s1s2 RRR. No MRG. PMI mid 5th intercostal space. Resp: Rales to bilateral.   Abd:  NT ND.  BS positive. No rebound or guarding. No masses. Ext: No edema or cyanosis. State post left BKA      Intake and Output:  Current Shift:  09/19 0701 - 09/19 1900  In: 960 [P.O.:960]  Out: 1050 [Urine:1050]  Last three shifts:  09/17 1901 - 09/19 0700  In: 120 [P.O.:120]  Out: 2773 [Urine:2475]    Labs: Results:       Chemistry Recent Labs     09/19/20  0350 09/18/20 0048 09/17/20  1332   GLU 78 74 95    142 143   K 3.7 3.4* 3.3*    104 109   CO2 30 32 27   BUN 18 17 19*   CREA 1.73* 1.70* 1.89*   CA 8.7 8.8 8.3*   AGAP 6 6 7   BUCR 10* 10* 10*   AP 22* 28* 26*   TP 6.4 7.2 6.4   ALB 3.8 3.7 3.3*   GLOB 2.6 3.5 3.1   AGRAT 1.5 1.1 1.1      CBC w/Diff Recent Labs     09/19/20  0350 09/18/20  0048 09/17/20  1332   WBC 6.4 10.3 7.2   RBC 3.69* 4.31* 3.77*   HGB 11.7* 13.7 12.1*   HCT 36.6 41.6 35.8*    228 213   GRANS 65 82* 72   LYMPH 28 12* 23   EOS 1 0 0      Cardiac Enzymes Recent Labs     09/18/20  0625 09/18/20  0048   CPK 1,701* 1,943*   CKND1 0.9 1.0      Coagulation Recent Labs     09/19/20  0350 09/18/20  0048 09/17/20  1332   PTP 14.7 14.0 14.3   INR 1.2 1.1 1.1   APTT  --   --  31.1       Lipid Panel No results found for: CHOL, CHOLPOCT, CHOLX, CHLST, CHOLV, 128785, HDL, HDLP, LDL, LDLC, DLDLP, 000880, VLDLC, VLDL, TGLX, TRIGL, TRIGP, TGLPOCT, CHHD, CHHDX   BNP No results for input(s): BNPP in the last 72 hours.    Liver Enzymes Recent Labs     09/19/20  0350   TP 6.4   ALB 3.8   AP 22*      Thyroid Studies Lab Results   Component Value Date/Time    .00 (H) 09/17/2020 01:32 PM        Procedures/imaging: see electronic medical records for all procedures/Xrays and details which were not copied into this note but were reviewed prior to creation of Plan      Medications Reviewed  Richard Bautista MD

## 2020-09-19 NOTE — PROGRESS NOTES
Problem: Patient Education: Go to Patient Education Activity  Goal: Patient/Family Education  Outcome: Progressing Towards Goal     Problem: Pain  Goal: *Control of Pain  Outcome: Progressing Towards Goal     Problem: Patient Education: Go to Patient Education Activity  Goal: Patient/Family Education  Outcome: Progressing Towards Goal     Problem: Fluid Volume - Risk of, Imbalanced  Goal: *Balanced intake and output  Outcome: Progressing Towards Goal     Problem: Patient Education: Go to Patient Education Activity  Goal: Patient/Family Education  Outcome: Progressing Towards Goal     Problem: Pressure Injury - Risk of  Goal: *Prevention of pressure injury  Description: Document Frederic Scale and appropriate interventions in the flowsheet.   Outcome: Progressing Towards Goal  Note: Pressure Injury Interventions:  Sensory Interventions: Assess changes in LOC    Moisture Interventions: Absorbent underpads    Activity Interventions: Increase time out of bed, Pressure redistribution bed/mattress(bed type), PT/OT evaluation    Mobility Interventions: HOB 30 degrees or less, Pressure redistribution bed/mattress (bed type), PT/OT evaluation    Nutrition Interventions: Document food/fluid/supplement intake                     Problem: Patient Education: Go to Patient Education Activity  Goal: Patient/Family Education  Outcome: Progressing Towards Goal     Problem: Patient Education: Go to Patient Education Activity  Goal: Patient/Family Education  Outcome: Progressing Towards Goal

## 2020-09-19 NOTE — PROGRESS NOTES
Problem: Mobility Impaired (Adult and Pediatric)  Goal: *Acute Goals and Plan of Care (Insert Text)  Description: Physical Therapy Goals   Initiated 9/18/2020 and to be accomplished within 3-5 day(s)  1. Patient will move from supine <> sit with S in prep for out of bed activity and change of position. 2.  Patient will perform sit<> stand with S with LRAD and prosthetic Lower limb in place in prep for transfers/ambulation. 3.  Patient will transfer from bed <> chair with S  with LRAD and prosthetic Lower limb in place for time up in chair for completion of ADL activity. 4.  Patient will ambulate 50 feet with S/LRAD and prosthetic Lower limb in place for improved functional mobility at discharge. 5.  Patient will ascend/descend 3-5 stairs with handrail(s) with minimal assistance/contact guard assist for home re-entry as needed. Note:   PHYSICAL THERAPY TREATMENT    Patient: Ivania Adames (8 y.o. male)  Date: 9/19/2020  Diagnosis: Pulmonary edema [J81.1]  Severe hypothyroidism [E03.8]   <principal problem not specified>  Precautions: Fall, Other (comment)(Prosthetic L Lower Limb)   Chart, physical therapy assessment, plan of care and goals were reviewed. ASSESSMENT:  Pt progressing slowly with mobility. Pt having difficulty with suction on prosthesis which causes much increased difficulty moving L LE. Pt was able to amb 5 ft c/RW and CGA. Pt reports he plans to schedule follow up appointment with  for prosthesis adjustments. Pt tolerated seated there ex, no pain. Pt performed with good technique after initial instruction. Pt left sitting in chair, RN notified of position. Will continue to progress amb and strength as pt tolerates.   Progression toward goals:  []      Improving appropriately and progressing toward goals  [x]      Improving slowly and progressing toward goals  []      Not making progress toward goals and plan of care will be adjusted     PLAN:  Patient continues to benefit from skilled intervention to address the above impairments. Continue treatment per established plan of care. Discharge Recommendations:  Home Health  Further Equipment Recommendations for Discharge:  N/A     SUBJECTIVE:   Patient stated It feels good to get up, my R leg feels so stiff.     OBJECTIVE DATA SUMMARY:   Critical Behavior:  Neurologic State: Alert, Appropriate for age  Orientation Level: Oriented X4  Cognition: Appropriate decision making, Appropriate for age attention/concentration, Appropriate safety awareness, Follows commands  Safety/Judgement: Awareness of environment, Fall prevention  Functional Mobility Training:  Bed Mobility:   Supine to Sit: Supervision  Transfers:  Sit to Stand: Contact guard assistance  Stand to Sit: Contact guard assistance  Balance:  Sitting: Intact  Standing: Impaired; With support  Standing - Static: Fair  Standing - Dynamic : Fair  Ambulation/Gait Training:  Distance (ft): 5 Feet (ft)  Assistive Device: Gait belt;Walker, rolling  Ambulation - Level of Assistance: Contact guard assistance  Gait Abnormalities: Decreased step clearance; Step to gait  Base of Support: Shift to right;Widened   Speed/Brigitte: Slow  Step Length: Right shortened;Left shortened  Therapeutic Exercises:   Seated there ex: R ankle pumps, LAQ, marches, hip abd/add, x10 reps ea  Pain:  Pain Scale 1: Numeric (0 - 10)  Pain Intensity 1: 0  Activity Tolerance:   Good  Please refer to the flowsheet for vital signs taken during this treatment.   After treatment:   [x] Patient left in no apparent distress sitting up in chair  [] Patient left in no apparent distress in bed  [x] Call bell left within reach  [x] Nursing notified  [] Caregiver present  [] Bed alarm activated      Raheem Whittaker   Time Calculation: 24 mins

## 2020-09-19 NOTE — PROGRESS NOTES
Bedside and Verbal shift change report given to Antonella Kasper (oncoming nurse) by Debra Rodriguez (offgoing nurse). Report included the following information SBAR, Kardex, Intake/Output, MAR and Recent Results.

## 2020-09-19 NOTE — PROGRESS NOTES
Problem: Falls - Risk of  Goal: *Absence of Falls  Description: Document Robby Arauz Fall Risk and appropriate interventions in the flowsheet.   Outcome: Progressing Towards Goal  Note: Fall Risk Interventions:  Mobility Interventions: Communicate number of staff needed for ambulation/transfer, Patient to call before getting OOB         Medication Interventions: Patient to call before getting OOB, Teach patient to arise slowly    Elimination Interventions: Call light in reach, Patient to call for help with toileting needs

## 2020-09-19 NOTE — PROGRESS NOTES
Physician Progress Note      PATIENTSevero Pillar  CSN #:                  [id number]  :                       1955  ADMIT DATE:       2020 1:10 PM  [address] [place] DATE:  RESPONDING  PROVIDER #:        FRACISCO Dang MD          QUERY TEXT:    Pt admitted with CHF . Noted documentation of  Severe Malnutrition  on   by ordered  RD consultant. If possible, please document in progress notes and discharge summary:    The medical record reflects the following:    Risk Factors: CHF  Clinical Indicators: PER RD -Severe malnutrition, In context of chronic illness related to catabolic illness as evidenced by severe muscle loss, severe loss of subcutaneous fat, localized or generalized fluid accumulation    Treatment: monitor intake; if intake remains >50% at most meals will not add supplement; if intake changes would recc to add Ensure Compact    Thank- You  Samreen Gamino RN -[phone]  Options provided:  -- Severe Malnutrition confirmed POA  -- Severe Malnutrition  ruled out  -- Other - I will add my own diagnosis  -- Disagree - Not applicable / Not valid  -- Disagree - Clinically unable to determine / Unknown  -- Refer to Clinical Documentation Reviewer    PROVIDER RESPONSE TEXT:    The diagnosis of Severe Malnutrition is confirmed and is POA.     Query created by: Tristan Brown on 2020 12:24 PM      Electronically signed by:  Mayco Person MD 2020 3:09 PM

## 2020-09-19 NOTE — PROGRESS NOTES
Cardiology Progress Note        Patient: Jesús Gaona        Sex: male          DOA: 9/17/2020  YOB: 1955      Age:  60 y.o.        LOS:  LOS: 2 days   Assessment/Plan     Active Problems:    Chronic hepatitis C ([facility] (10/29/2014)      S/P BKA (below knee amputation) ([facility] (10/29/2014)      Overview: Left      Paranoid schizophrenia, subchronic condition ([facility] (10/29/2014)      Severe hypothyroidism (9/17/2020)      Pulmonary edema (9/17/2020)      Acute on chronic combined systolic and diastolic CH[facility], [facility] (9/19/2020)        Plan:  Mild bradycardia when sleeping in 40s, not a candidate for BB  Continue with lasix titrate with BP  Will consider ACEI/ARB if BP and renal function stable  Discussed with patient. Will need ischemic cardiac work when stable in future. Subjective:    cc:  CHF        REVIEW OF SYSTEMS:    no complaints  General: No fevers or chills. Cardiovascular: No chest pain or pressure. No palpitations. No ankle swelling  Pulmonary: No SOB, orthopnea, PND  Gastrointestinal: No nausea, vomiting or diarrhea      Objective:      Visit Vitals  /60   Pulse 71   Temp 99 °F (37.2 °C)   Resp 20   Ht 5' 2\" (1.575 m)   Wt 49.8 kg (109 lb 11.2 oz)   SpO2 95%   BMI 20.06 kg/m²     Body mass index is 20.06 kg/m². Physical Exam:  General Appearance: Comfortable, not using accessory muscles of respiration. NECK: No JVD, no thyroidomeglay. LUNGS: Clear bilaterally. HEART: S1+S2 audible,    ABD: Non-tender, BS Audible    EXT: No edema, and no cysnosis. L AKA  VASCULAR EXAM: Pulses are intact. PSYCHIATRIC EXAM: Mood is appropriate.     Medication:  Current Facility-Administered Medications   Medication Dose Route Frequency    pantoprazole (PROTONIX) 40 mg in 0.9% sodium chloride 10 mL injection  40 mg IntraVENous Q24H    potassium chloride (K-DUR, KLOR-CON) SR tablet 20 mEq  20 mEq Oral BID    furosemide (LASIX) injection 20 mg 20 mg IntraVENous BID    risperiDONE (RisperDAL) tablet 0.5 mg  0.5 mg Oral QHS    albumin human 25% (BUMINATE) solution 12.5 g  12.5 g IntraVENous Q6H    levothyroxine (SYNTHROID) tablet 125 mcg  125 mcg Oral DAILY    sodium chloride (NS) flush 5-40 mL  5-40 mL IntraVENous Q8H    sodium chloride (NS) flush 5-40 mL  5-40 mL IntraVENous PRN    acetaminophen (TYLENOL) tablet 650 mg  650 mg Oral Q6H PRN    Or    acetaminophen (TYLENOL) suppository 650 mg  650 mg Rectal Q6H PRN    polyethylene glycol (MIRALAX) packet 17 g  17 g Oral DAILY PRN    enoxaparin (LOVENOX) injection 40 mg  40 mg SubCUTAneous DAILY               Lab/Data Reviewed:  Procedures/imaging: see electronic medical records for all procedures/Xrays   and details which were not copied into this note but were reviewed prior to creation of Plan       All lab results for the last 24 hours reviewed. Recent Labs     09/19/20  0350 09/18/20  0048 09/17/20  1332   WBC 6.4 10.3 7.2   HGB 11.7* 13.7 12.1*   HCT 36.6 41.6 35.8*    228 213     Recent Labs     09/19/20  0350 09/18/20  0048 09/17/20  1332    142 143   K 3.7 3.4* 3.3*    104 109   CO2 30 32 27   GLU 78 74 95   BUN 18 17 19*   CREA 1.73* 1.70* 1.89*   CA 8.7 8.8 8.3*       RADIOLOGY:  CT Results  (Last 48 hours)               09/17/20 1613  CTA CHEST W OR W WO CONT Final result    Impression:  IMPRESSION:       1. No evidence of pulmonary thromboembolic disease. 2. Diffusely increased interstitial and interseptal thickening, inferior reflux   of contrast into the hepatic cava, and several regions of groundglass opacity   with small, left greater than right pleural effusions; findings are suggestive   of acute, probably cardiogenic pulmonary edema and right heart failure. 3. Partially visualized upper abdominal ascites, hepatic morphology raises   concern for hepatic cirrhosis. Narrative:  EXAM: CTA CHEST W OR W WO CONT       CLINICAL INDICATION/HISTORY: Chest pain with elevated d-dimer       COMPARISON: Chest radiograph same day       TECHNIQUE: Thin section CT was performed through the chest during pulmonary   arterial enhancement. MIP 3D reconstructions were generated to increase   sensitivity in the detection of pulmonary emboli. One or more dose reduction   techniques were used on this CT: automated exposure control, adjustment of the   mAs and/or kVp according to patient size, and iterative reconstruction   techniques. The specific techniques used on this CT exam have been documented   in the patient's electronic medical record. Digital Imaging and Communications   in Medicine (DICOM) format image data are available to nonaffiliated external   healthcare facilities or entities on a secure, media free, reciprocally   searchable basis with patient authorization for at least a 12-month period after   this study. --- EXAM QUALITY ---       1. Overall exam quality- satisfactory: adequate    2. Adequacy of pulmonary enhancement- adequate: sufficient enhancement for   diagnosis down to and including subsegmental vessels. Main PA density 190-250   HU   3. Adequacy of breath hold- adequate: sufficient enough to render diagnosis    4. There are no significant noise, beam hardening, streak artifacts affecting   scan quality. _______________       FINDINGS:       ---  PULMONARY CT ANGIOGRAM  ---       PULMONARY VASCULATURE: The right and left central, primary segmental and   subsegmental pulmonary arteries appear patent. There is no convincing evidence   of intraluminal filling defect identified to suggest pulmonary embolism. THORACIC AORTA: Normal caliber thoracic aorta. No aortic aneurysm, intramural   hematoma or dissection.        ---  CT CHEST ---       LUNG PARENCHYMA:   Diffusely increased interstitial and interseptal thickening   is seen throughout the lungs. Multiple regions of groundglass opacity are also   present. There are a few more focal regions of solid and subpleural groundglass   nodularity as well. Moderate, passive atelectasis throughout basilar segments of   left greater than right lower lobes adjacent to pleural effusions. There are   dense segmental or lobar consolidation. PLEURAL SPACES:   Small left and tiny right pleural effusions. No pneumothorax. MEDIASTINUM/CHEST WALL:   Global cardiomegaly. Pericardial effusion measures up   to 0.9 cm in thickness. No global cardiomegaly. No pericardial effusion. Mild   soft tissue anasarca. OSSEOUS STRUCTURES:   No acute osseous abnormality. Moderate midthoracic   degenerative disk disease. UPPER ABDOMEN: Fatty morphology concerning for cirrhosis. Partially visualized,   mild upper abdominal ascites. Abnormal, inferior reflux of contrast throughout   the hepatic cava.  Small hiatal hernia.   _______________               CXR Results  (Last 48 hours)    None            Cardiology Procedures:   Results for orders placed or performed during the hospital encounter of 09/17/20   EKG, 12 LEAD, INITIAL   Result Value Ref Range    Ventricular Rate 84 BPM    Atrial Rate 84 BPM    P-R Interval 150 ms    QRS Duration 94 ms    Q-T Interval 384 ms    QTC Calculation (Bezet) 453 ms    Calculated P Axis 60 degrees    Calculated R Axis 18 degrees    Calculated T Axis 50 degrees    Diagnosis       Normal sinus rhythm  Possible Anterior infarct , age undetermined  Abnormal ECG  Confirmed by Sacha Ray MD, [facility] ([id number]) on 9/18/2020 12:26:50 AM        Echo Results  (Last 48 hours)    None       Cardiolite (Tc-99m Sestamibi) stress test    Signed By: Ana Rosa Bhardwaj MD     September 19, 2020

## 2020-09-19 NOTE — PROGRESS NOTES
1915  Assumed care of pt  2045  Reassessment complete   0015  Reassessment complete   0348  Reassessment complete     [facility] [place] [facility] Night Shift Chart Audit    Chart Audit completed? YES    Bedside and Verbal shift change report given to COURTNEY Ferguson (oncoming nurse) by Asael Chávez RN (offgoing nurse).  Report included the following information SBAR, Kardex, ED Summary, Intake/Output, MAR, Recent Results, Med Rec Status and Cardiac Rhythm NSR; SB.

## 2020-09-20 LAB
ALBUMIN SERPL-MCNC: 3.9 G/DL (ref 3.4–5)
ALBUMIN/GLOB SERPL: 1.3 {RATIO} (ref 0.8–1.7)
ALP SERPL-CCNC: 29 U/L (ref 45–117)
ALT SERPL-CCNC: 23 U/L (ref 16–61)
AMMONIA PLAS-SCNC: 10 UMOL/L (ref 11–32)
ANION GAP SERPL CALC-SCNC: 7 MMOL/L (ref 3–18)
AST SERPL-CCNC: 50 U/L (ref 10–38)
BASOPHILS # BLD: 0 K/UL (ref 0–0.1)
BASOPHILS NFR BLD: 0 % (ref 0–2)
BILIRUB SERPL-MCNC: 0.6 MG/DL (ref 0.2–1)
BUN SERPL-MCNC: 22 MG/DL (ref 7–18)
BUN/CREAT SERPL: 12 (ref 12–20)
CALCIUM SERPL-MCNC: 9.2 MG/DL (ref 8.5–10.1)
CHLORIDE SERPL-SCNC: 100 MMOL/L (ref 100–111)
CO2 SERPL-SCNC: 33 MMOL/L (ref 21–32)
CREAT SERPL-MCNC: 1.86 MG/DL (ref 0.6–1.3)
DIFFERENTIAL METHOD BLD: ABNORMAL
EOSINOPHIL # BLD: 0.1 K/UL (ref 0–0.4)
EOSINOPHIL NFR BLD: 1 % (ref 0–5)
ERYTHROCYTE [DISTWIDTH] IN BLOOD BY AUTOMATED COUNT: 16.8 % (ref 11.6–14.5)
GLOBULIN SER CALC-MCNC: 2.9 G/DL (ref 2–4)
GLUCOSE SERPL-MCNC: 81 MG/DL (ref 74–99)
HCT VFR BLD AUTO: 37.6 % (ref 36–48)
HGB BLD-MCNC: 12.4 G/DL (ref 13–16)
INR PPP: 1 (ref 0.8–1.2)
LYMPHOCYTES # BLD: 1.6 K/UL (ref 0.9–3.6)
LYMPHOCYTES NFR BLD: 20 % (ref 21–52)
MAGNESIUM SERPL-MCNC: 2.2 MG/DL (ref 1.6–2.6)
MCH RBC QN AUTO: 31.7 PG (ref 24–34)
MCHC RBC AUTO-ENTMCNC: 33 G/DL (ref 31–37)
MCV RBC AUTO: 96.2 FL (ref 74–97)
MONOCYTES # BLD: 0.5 K/UL (ref 0.05–1.2)
MONOCYTES NFR BLD: 6 % (ref 3–10)
NEUTS SEG # BLD: 5.8 K/UL (ref 1.8–8)
NEUTS SEG NFR BLD: 73 % (ref 40–73)
PLATELET # BLD AUTO: 169 K/UL (ref 135–420)
PLATELET COMMENTS,PCOM: ABNORMAL
PMV BLD AUTO: 13.9 FL (ref 9.2–11.8)
POTASSIUM SERPL-SCNC: 4 MMOL/L (ref 3.5–5.5)
PROT SERPL-MCNC: 6.8 G/DL (ref 6.4–8.2)
PROTHROMBIN TIME: 13.4 SEC (ref 11.5–15.2)
RBC # BLD AUTO: 3.91 M/UL (ref 4.7–5.5)
RBC MORPH BLD: ABNORMAL
SODIUM SERPL-SCNC: 140 MMOL/L (ref 136–145)
WBC # BLD AUTO: 8 K/UL (ref 4.6–13.2)

## 2020-09-20 PROCEDURE — 83735 ASSAY OF MAGNESIUM: CPT

## 2020-09-20 PROCEDURE — 65660000000 HC RM CCU STEPDOWN

## 2020-09-20 PROCEDURE — 80053 COMPREHEN METABOLIC PANEL: CPT

## 2020-09-20 PROCEDURE — 74011000250 HC RX REV CODE- 250: Performed by: INTERNAL MEDICINE

## 2020-09-20 PROCEDURE — 85610 PROTHROMBIN TIME: CPT

## 2020-09-20 PROCEDURE — 74011250637 HC RX REV CODE- 250/637: Performed by: INTERNAL MEDICINE

## 2020-09-20 PROCEDURE — 74011250637 HC RX REV CODE- 250/637: Performed by: HOSPITALIST

## 2020-09-20 PROCEDURE — C9113 INJ PANTOPRAZOLE SODIUM, VIA: HCPCS | Performed by: INTERNAL MEDICINE

## 2020-09-20 PROCEDURE — P9047 ALBUMIN (HUMAN), 25%, 50ML: HCPCS | Performed by: INTERNAL MEDICINE

## 2020-09-20 PROCEDURE — 36415 COLL VENOUS BLD VENIPUNCTURE: CPT

## 2020-09-20 PROCEDURE — 74011250636 HC RX REV CODE- 250/636: Performed by: INTERNAL MEDICINE

## 2020-09-20 PROCEDURE — 74011250636 HC RX REV CODE- 250/636: Performed by: HOSPITALIST

## 2020-09-20 PROCEDURE — 85025 COMPLETE CBC W/AUTO DIFF WBC: CPT

## 2020-09-20 PROCEDURE — 97116 GAIT TRAINING THERAPY: CPT

## 2020-09-20 PROCEDURE — 97110 THERAPEUTIC EXERCISES: CPT

## 2020-09-20 PROCEDURE — 82140 ASSAY OF AMMONIA: CPT

## 2020-09-20 RX ORDER — ENOXAPARIN SODIUM 100 MG/ML
30 INJECTION SUBCUTANEOUS DAILY
Status: DISCONTINUED | OUTPATIENT
Start: 2020-09-21 | End: 2020-09-24 | Stop reason: HOSPADM

## 2020-09-20 RX ADMIN — RISPERIDONE 0.5 MG: 0.25 TABLET, FILM COATED ORAL at 22:38

## 2020-09-20 RX ADMIN — ALBUMIN (HUMAN) 12.5 G: 0.25 INJECTION, SOLUTION INTRAVENOUS at 15:00

## 2020-09-20 RX ADMIN — ALBUMIN (HUMAN) 12.5 G: 0.25 INJECTION, SOLUTION INTRAVENOUS at 22:38

## 2020-09-20 RX ADMIN — FUROSEMIDE 20 MG: 10 INJECTION, SOLUTION INTRAMUSCULAR; INTRAVENOUS at 09:08

## 2020-09-20 RX ADMIN — ALBUMIN (HUMAN) 12.5 G: 0.25 INJECTION, SOLUTION INTRAVENOUS at 01:11

## 2020-09-20 RX ADMIN — ENOXAPARIN SODIUM 40 MG: 40 INJECTION SUBCUTANEOUS at 09:08

## 2020-09-20 RX ADMIN — SODIUM CHLORIDE 40 MG: 9 INJECTION, SOLUTION INTRAMUSCULAR; INTRAVENOUS; SUBCUTANEOUS at 22:38

## 2020-09-20 RX ADMIN — ALBUMIN (HUMAN) 12.5 G: 0.25 INJECTION, SOLUTION INTRAVENOUS at 06:34

## 2020-09-20 RX ADMIN — Medication 10 ML: at 14:00

## 2020-09-20 RX ADMIN — LEVOTHYROXINE SODIUM 125 MCG: 0.03 TABLET ORAL at 09:09

## 2020-09-20 RX ADMIN — FUROSEMIDE 20 MG: 10 INJECTION, SOLUTION INTRAMUSCULAR; INTRAVENOUS at 22:38

## 2020-09-20 RX ADMIN — Medication 10 ML: at 22:39

## 2020-09-20 RX ADMIN — Medication 10 ML: at 09:09

## 2020-09-20 NOTE — PROGRESS NOTES
Problem: Mobility Impaired (Adult and Pediatric)  Goal: *Acute Goals and Plan of Care (Insert Text)  Description: Physical Therapy Goals   Initiated 9/18/2020 and to be accomplished within 3-5 day(s)  1. Patient will move from supine <> sit with S in prep for out of bed activity and change of position. 2.  Patient will perform sit<> stand with S with LRAD and prosthetic Lower limb in place in prep for transfers/ambulation. 3.  Patient will transfer from bed <> chair with S  with LRAD and prosthetic Lower limb in place for time up in chair for completion of ADL activity. 4.  Patient will ambulate 50 feet with S/LRAD and prosthetic Lower limb in place for improved functional mobility at discharge. 5.  Patient will ascend/descend 3-5 stairs with handrail(s) with minimal assistance/contact guard assist for home re-entry as needed. Note:   PHYSICAL THERAPY TREATMENT    Patient: Evelyne Mendoza (72 y.o. male)  Date: 9/20/2020  Diagnosis: Pulmonary edema [J81.1]  Severe hypothyroidism [E03.8]   <principal problem not specified>  Precautions: Fall, Other (comment)(Prosthetic L Lower Limb)   Chart, physical therapy assessment, plan of care and goals were reviewed. ASSESSMENT:  Pt progressing well with mobility. Pt tolerated seated there ex as described below. Pt reports he performs LE exercises throughout the day to prevent muscle stiffness. Pt increased ambulation distance to 35 ft c/RW, CGA for safety; slow gait due to pt having to frequently adjust prosthesis. Pt left sitting up in chair. Will continue to progress mobility as pt tolerates. Progression toward goals:  [x]      Improving appropriately and progressing toward goals  []      Improving slowly and progressing toward goals  []      Not making progress toward goals and plan of care will be adjusted     PLAN:  Patient continues to benefit from skilled intervention to address the above impairments.   Continue treatment per established plan of care.  Discharge Recommendations:  Home Health  Further Equipment Recommendations for Discharge: Follow up with [facility]     SUBJECTIVE:   Patient stated I am feeling pretty good today.     OBJECTIVE DATA SUMMARY:   Critical Behavior:  Neurologic State: Alert, Appropriate for age  Orientation Level: Oriented X4  Cognition: Appropriate decision making, Appropriate for age attention/concentration, Appropriate safety awareness, Follows commands  Safety/Judgement: Awareness of environment, Fall prevention  Functional Mobility Training:  Transfers:  Sit to Stand: Contact guard assistance  Stand to Sit: Contact guard assistance  Balance:  Sitting: Intact  Standing: Intact; With support  Ambulation/Gait Training:  Distance (ft): 35 Feet (ft)  Assistive Device: Gait belt;Walker, rolling  Ambulation - Level of Assistance: Contact guard assistance  Gait Abnormalities: Decreased step clearance; Step to gait  Base of Support: Shift to right  Stance: Right increased  Speed/Brigitte: Slow  Step Length: Left shortened;Right shortened  Therapeutic Exercises:   Seated there ex: R LE ankle pumps, LAQ, ham curls with manual resistance, hip add with pillow, marches, resisted hip ext, L LE hip abd/add, x10 reps ea  Pain:  Pain Scale 1: Numeric (0 - 10)  Pain Intensity 1: 0  Activity Tolerance:   Good  Please refer to the flowsheet for vital signs taken during this treatment.   After treatment:   [] Patient left in no apparent distress sitting up in chair  [x] Patient left in no apparent distress in bed  [x] Call bell left within reach  [x] Nursing notified  [] Caregiver present  [] Bed alarm activated      Lore Whittaker   Time Calculation: 23 mins

## 2020-09-20 NOTE — PROGRESS NOTES
Bedside and Verbal shift change report given to Myla Porter (oncoming nurse) by Grazyna Headley (offgoing nurse). Report included the following information SBAR, Kardex, Procedure Summary, Intake/Output, MAR and Recent Results.

## 2020-09-20 NOTE — PROGRESS NOTES
Cardiology Progress Note        Patient: Radha Pereira        Sex: male          DOA: 9/17/2020  YOB: 1955      Age:  60 y.o.        LOS:  LOS: 3 days   Assessment/Plan     Active Problems:    Chronic hepatitis C ([facility] [address] (10/29/2014)      S/P BKA (below knee amputation) ([facility] (10/29/2014)      Overview: Left      Paranoid schizophrenia, subchronic condition ([facility] [address] (10/29/2014)      Severe hypothyroidism (9/17/2020)      Pulmonary edema (9/17/2020)      Acute on chronic combined systolic and diastolic CH[facility], [facility] (9/19/2020)        Plan:      Mild bradycardia when sleeping in 40s, not a candidate for BB  Continue with lasix titrate with BP  Will consider ACEI/ARB if BP and renal function stable  Discussed with patient. Will need ischemic cardiac work when stable in future. Dr. Anastasia Joel will be seeing pt from tomorrow. Subjective:    cc:  CHF  CMP        REVIEW OF SYSTEMS:     General: No fevers or chills. Cardiovascular: No chest pain or pressure. No palpitations. No ankle swelling  Pulmonary: No SOB, orthopnea, PND  Gastrointestinal: No nausea, vomiting or diarrhea      Objective:      Visit Vitals  /76   Pulse 78   Temp 98.3 °F (36.8 °C)   Resp 15   Ht 5' 2\" (1.575 m)   Wt 49.8 kg (109 lb 11.2 oz)   SpO2 99%   BMI 20.06 kg/m²     Body mass index is 20.06 kg/m². Physical Exam:  General Appearance: Comfortable, not using accessory muscles of respiration. NECK: No JVD, no thyroidomeglay. LUNGS: Clear bilaterally. HEART: S1+S2 audible,    ABD: Non-tender, BS Audible    EXT: No edema, and no cysnosis. L AKA  VASCULAR EXAM: Pulses are intact. PSYCHIATRIC EXAM: Mood is appropriate.     Medication:  Current Facility-Administered Medications   Medication Dose Route Frequency    pantoprazole (PROTONIX) 40 mg in 0.9% sodium chloride 10 mL injection  40 mg IntraVENous Q24H    furosemide (LASIX) injection 20 mg  20 mg IntraVENous BID    risperiDONE (RisperDAL) tablet 0.5 mg  0.5 mg Oral QHS    albumin human 25% (BUMINATE) solution 12.5 g  12.5 g IntraVENous Q6H    levothyroxine (SYNTHROID) tablet 125 mcg  125 mcg Oral DAILY    sodium chloride (NS) flush 5-40 mL  5-40 mL IntraVENous Q8H    sodium chloride (NS) flush 5-40 mL  5-40 mL IntraVENous PRN    acetaminophen (TYLENOL) tablet 650 mg  650 mg Oral Q6H PRN    Or    acetaminophen (TYLENOL) suppository 650 mg  650 mg Rectal Q6H PRN    polyethylene glycol (MIRALAX) packet 17 g  17 g Oral DAILY PRN    enoxaparin (LOVENOX) injection 40 mg  40 mg SubCUTAneous DAILY               Lab/Data Reviewed:  Procedures/imaging: see electronic medical records for all procedures/Xrays   and details which were not copied into this note but were reviewed prior to creation of Plan       All lab results for the last 24 hours reviewed.      Recent Labs     09/20/20  0050 09/19/20  0350 09/18/20  0048   WBC 8.0 6.4 10.3   HGB 12.4* 11.7* 13.7   HCT 37.6 36.6 41.6    197 228     Recent Labs     09/20/20  0050 09/19/20  0350 09/18/20  0048    139 142   K 4.0 3.7 3.4*    103 104   CO2 33* 30 32   GLU 81 78 74   BUN 22* 18 17   CREA 1.86* 1.73* 1.70*   CA 9.2 8.7 8.8       RADIOLOGY:  CT Results  (Last 48 hours)    None        CXR Results  (Last 48 hours)    None            Cardiology Procedures:   Results for orders placed or performed during the hospital encounter of 09/17/20   EKG, 12 LEAD, INITIAL   Result Value Ref Range    Ventricular Rate 84 BPM    Atrial Rate 84 BPM    P-R Interval 150 ms    QRS Duration 94 ms    Q-T Interval 384 ms    QTC Calculation (Bezet) 453 ms    Calculated P Axis 60 degrees    Calculated R Axis 18 degrees    Calculated T Axis 50 degrees    Diagnosis       Normal sinus rhythm  Possible Anterior infarct , age undetermined  Abnormal ECG  Confirmed by Vanna Mac MD, [facility] ([id number]) on 9/18/2020 12:26:50 AM        Echo Results  (Last 48 hours)    None       Cardiolite (Tc-99m Sestamibi) stress test    Signed By: Madelin Porras MD     September 20, 2020

## 2020-09-20 NOTE — PROGRESS NOTES
Hospitalist Progress Note    Patient: Ivania Adames MRN: [id number]  CSN: [id number]    YOB: 1955  Age: 60 y.o. Sex: male    DOA: 9/17/2020 LOS:  LOS: 3 days            Assessment/Plan     Active Problems:    Chronic hepatitis C ([facility] (10/29/2014)      S/P BKA (below knee amputation) ([facility] [address] (10/29/2014)      Overview: Left      Paranoid schizophrenia, subchronic condition ([facility] [address] (10/29/2014)      Severe hypothyroidism (9/17/2020)      Pulmonary edema (9/17/2020)      Acute on chronic combined systolic and diastolic CHF, NYHA class 4 ([facility] [address] (9/19/2020)    Acute combined CHF exacerbation: I have reviewed echo report/EF of 33%/ systolic and diastolic CHF class IV. continue IV lasix as has improved, mild elevation in trops, may need ischemia eval while here, Discussed the case with cardiology/Dr. Jenni Berry.      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 Pending fluids results.     OLIVIA versus underlying CKD, reduce lasix dose BID    Noncompliance with medication regimen.     Due to poor living situation lately, we need case mgmt on his case    Long discussion with the patient re medicines, hospital course. We reviewed and discussed assessment of condition and went over plans of care. Questions answered. Total time 45 min's, including more than 50% on discussion with coordinating care. Dispo: 2 to 3 days     CC:   Shortness of breath secondary to acute on chronic combined CHF, hep C cirrhosis, severe hypothyroidism, left BKA, history of schizophrenia              Subjective:     Pt was seen and examined with the nurse in the morning round.      \" I know I have not been taking my medication and have not been seen by  I should do better than that. I am feeling better though\". Review of systems  General: No fevers or chills. Cardiovascular: No chest pain or pressure. No palpitations. Pulmonary: No cough, SOB  Gastrointestinal: No nausea, vomiting. Objective:      Visit Vitals  /88 (BP 1 Location: Right arm, BP Patient Position: At rest;Sitting)   Pulse 84   Temp 98.4 °F (36.9 °C)   Resp 18   Ht 5' 2\" (1.575 m)   Wt 49.8 kg (109 lb 11.2 oz)   SpO2 100%   BMI 20.06 kg/m²       Physical Exam:    Gen: NAD, frail   Heent:  MMM, NC, AT. Cor: s1s2 RRR. No MRG. PMI mid 5th intercostal space. Resp:  Rales to LL bilateral.   Abd:  NT ND.  BS positive. No rebound or guarding. No masses. Ext: No edema or cyanosis. Intake and Output:  Current Shift:  No intake/output data recorded. Last three shifts:  09/18 1901 - 09/20 0700  In: 960 [P.O.:960]  Out: 2100 [Urine:2100]    Labs: Results:       Chemistry Recent Labs     09/20/20  0050 09/19/20  0350 09/18/20  0048   GLU 81 78 74    139 142   K 4.0 3.7 3.4*    103 104   CO2 33* 30 32   BUN 22* 18 17   CREA 1.86* 1.73* 1.70*   CA 9.2 8.7 8.8   AGAP 7 6 6   BUCR 12 10* 10*   AP 29* 22* 28*   TP 6.8 6.4 7.2   ALB 3.9 3.8 3.7   GLOB 2.9 2.6 3.5   AGRAT 1.3 1.5 1.1      CBC w/Diff Recent Labs     09/20/20  0050 09/19/20  0350 09/18/20  0048   WBC 8.0 6.4 10.3   RBC 3.91* 3.69* 4.31*   HGB 12.4* 11.7* 13.7   HCT 37.6 36.6 41.6    197 228   GRANS 73 65 82*   LYMPH 20* 28 12*   EOS 1 1 0      Cardiac Enzymes Recent Labs     09/18/20  0625 09/18/20  0048   CPK 1,701* 1,943*   CKND1 0.9 1.0      Coagulation Recent Labs     09/20/20  0050 09/19/20  0350  09/17/20  1332   PTP 13.4 14.7   < > 14.3   INR 1.0 1.2   < > 1.1   APTT  --   --   --  31.1    < > = values in this interval not displayed.        Lipid Panel No results found for: CHOL, CHOLPOCT, CHOLX, CHLST, CHOLV, 205867, HDL, HDLP, LDL, LDLC, DLDLP, 330418, VLDLC, VLDL, TGLX, TRIGL, TRIGP, TGLPOCT, CHHD, CHHDX   BNP No results for input(s): BNPP in the last 72 hours.    Liver Enzymes Recent Labs     09/20/20  0050   TP 6.8   ALB 3.9   AP 29*      Thyroid Studies Lab Results   Component Value Date/Time    .00 (H) 09/17/2020 01:32 PM        Procedures/imaging: see electronic medical records for all procedures/Xrays and details which were not copied into this note but were reviewed prior to creation of Plan      Medications Reviewed  Eric Le MD

## 2020-09-20 NOTE — PROGRESS NOTES
Problem: Falls - Risk of  Goal: *Absence of Falls  Description: Document Guillermo Morgan Fall Risk and appropriate interventions in the flowsheet. Outcome: Progressing Towards Goal  Note: Fall Risk Interventions:  Mobility Interventions: Communicate number of staff needed for ambulation/transfer, Patient to call before getting OOB         Medication Interventions: Patient to call before getting OOB, Teach patient to arise slowly    Elimination Interventions: Call light in reach              Problem: Patient Education: Go to Patient Education Activity  Goal: Patient/Family Education  Outcome: Progressing Towards Goal     Problem: Pain  Goal: *Control of Pain  Outcome: Progressing Towards Goal     Problem: Patient Education: Go to Patient Education Activity  Goal: Patient/Family Education  Outcome: Progressing Towards Goal     Problem: Fluid Volume - Risk of, Imbalanced  Goal: *Balanced intake and output  Outcome: Progressing Towards Goal     Problem: Patient Education: Go to Patient Education Activity  Goal: Patient/Family Education  Outcome: Progressing Towards Goal     Problem: Pressure Injury - Risk of  Goal: *Prevention of pressure injury  Description: Document Frederic Scale and appropriate interventions in the flowsheet.   Outcome: Progressing Towards Goal  Note: Pressure Injury Interventions:  Sensory Interventions: Assess changes in LOC    Moisture Interventions: Absorbent underpads    Activity Interventions: Increase time out of bed, Pressure redistribution bed/mattress(bed type), PT/OT evaluation    Mobility Interventions: HOB 30 degrees or less, Pressure redistribution bed/mattress (bed type), PT/OT evaluation    Nutrition Interventions: Document food/fluid/supplement intake                     Problem: Patient Education: Go to Patient Education Activity  Goal: Patient/Family Education  Outcome: Progressing Towards Goal     Problem: Patient Education: Go to Patient Education Activity  Goal: Patient/Family Education  Outcome: Progressing Towards Goal

## 2020-09-20 NOTE — PROGRESS NOTES
Pharmacy Dosing Services: Lovenox    Pharmacist Renal Dosing Progress Note for Lovenox   Physician Jabari    The following medication: Lovenox was automatically dose-adjusted per [facility] P&T Committee Protocol, with respect to renal function. Consult provided for this   60 y.o. , male , for the indication of DVT Prophylaxis. Pt Weight:   Wt Readings from Last 1 Encounters:   09/19/20 49.8 kg (109 lb 11.2 oz)         Previous Regimen Lovenox 40 mg SQ daily   Serum Creatinine Lab Results   Component Value Date/Time    Creatinine 1.86 (H) 09/20/2020 12:50 AM       Creatinine Clearance Estimated Creatinine Clearance: 28.3 mL/min (A) (based on SCr of 1.86 mg/dL (H)). BUN Lab Results   Component Value Date/Time    BUN 22 (H) 09/20/2020 12:50 AM       Dosage changed to:  Lovenox 30mg SQ daily    Pharmacy to continue to monitor patient daily. Will make dosage adjustments based upon changing renal function. Signed Hector Cortes.  Contact information: [phone]

## 2020-09-20 NOTE — PROGRESS NOTES
1915  Assumed care of pt  2000  Shift assessment complete   0000  Reassessment complete   0400  Reassessment complete     [facility] Cardiac/Medical Night Shift Chart Audit    Chart Audit completed? YES      Bedside and Verbal shift change report given to COURTNEY Ferguson (oncoming nurse) by Garrett Merlos RN (offgoing nurse). Report included the following information SBAR, Kardex, ED Summary, Intake/Output, MAR, Recent Results, Med Rec Status and Cardiac Rhythm NSR.

## 2020-09-21 LAB
AMMONIA PLAS-SCNC: <10 UMOL/L (ref 11–32)
INR PPP: 1 (ref 0.8–1.2)
MAGNESIUM SERPL-MCNC: 2.3 MG/DL (ref 1.6–2.6)
PROTHROMBIN TIME: 13 SEC (ref 11.5–15.2)
TSH SERPL DL<=0.05 MIU/L-ACNC: 145 UIU/ML (ref 0.36–3.74)

## 2020-09-21 PROCEDURE — 74011250637 HC RX REV CODE- 250/637: Performed by: HOSPITALIST

## 2020-09-21 PROCEDURE — 65660000000 HC RM CCU STEPDOWN

## 2020-09-21 PROCEDURE — 97116 GAIT TRAINING THERAPY: CPT

## 2020-09-21 PROCEDURE — 82140 ASSAY OF AMMONIA: CPT

## 2020-09-21 PROCEDURE — 74011250636 HC RX REV CODE- 250/636: Performed by: INTERNAL MEDICINE

## 2020-09-21 PROCEDURE — 83735 ASSAY OF MAGNESIUM: CPT

## 2020-09-21 PROCEDURE — 74011250637 HC RX REV CODE- 250/637: Performed by: INTERNAL MEDICINE

## 2020-09-21 PROCEDURE — 36415 COLL VENOUS BLD VENIPUNCTURE: CPT

## 2020-09-21 PROCEDURE — 84443 ASSAY THYROID STIM HORMONE: CPT

## 2020-09-21 PROCEDURE — 85610 PROTHROMBIN TIME: CPT

## 2020-09-21 RX ORDER — PANTOPRAZOLE SODIUM 40 MG/1
40 TABLET, DELAYED RELEASE ORAL
Status: DISCONTINUED | OUTPATIENT
Start: 2020-09-21 | End: 2020-09-24 | Stop reason: HOSPADM

## 2020-09-21 RX ORDER — RISPERIDONE 0.25 MG/1
0.5 TABLET, FILM COATED ORAL 2 TIMES DAILY
Status: DISCONTINUED | OUTPATIENT
Start: 2020-09-21 | End: 2020-09-24 | Stop reason: HOSPADM

## 2020-09-21 RX ORDER — FUROSEMIDE 40 MG/1
40 TABLET ORAL DAILY
Status: DISCONTINUED | OUTPATIENT
Start: 2020-09-21 | End: 2020-09-23

## 2020-09-21 RX ADMIN — Medication 10 ML: at 17:26

## 2020-09-21 RX ADMIN — LEVOTHYROXINE SODIUM 125 MCG: 0.03 TABLET ORAL at 09:13

## 2020-09-21 RX ADMIN — RISPERIDONE 0.5 MG: 0.25 TABLET ORAL at 22:12

## 2020-09-21 RX ADMIN — FUROSEMIDE 40 MG: 40 TABLET ORAL at 09:13

## 2020-09-21 RX ADMIN — ENOXAPARIN SODIUM 30 MG: 40 INJECTION SUBCUTANEOUS at 09:13

## 2020-09-21 RX ADMIN — PANTOPRAZOLE SODIUM 40 MG: 40 TABLET, DELAYED RELEASE ORAL at 09:13

## 2020-09-21 NOTE — ROUTINE PROCESS
Bedside shift change report given to Vivian Landeros RN (oncoming nurse) by Ursula Mancilla 
 (offgoing nurse). Report included the following information SBAR, Kardex, Intake/Output and MAR.

## 2020-09-21 NOTE — PROGRESS NOTES
Comprehensive Nutrition Assessment    Type and Reason for Visit: Reassess    Nutrition Recommendations/Plan: Will order ensure enlive to optimize nutritional status during admission  Continue to meet estimated needs throughout the next 5 days  Monitor bowel function appears no BM since admission    Nutrition Assessment:  Pt s/p paracentesis, states feeling better per MD note. PO Intakes are adequate, pt is eating 100% at most meals. Estimated Daily Nutrient Needs:  Energy (kcal):  (P) 1743(35kcal/kg)  Protein (g):  (P) 50(1g/kg)       Fluid (ml/day):  (P) 1743    Nutrition Related Findings:  (P) +BM 9/16 no BM since. Labs reviewed. Meds: lasix, synthroid, protonix.  wt down likely secondary to paracentesis and fluid status I/O -3L net      Wounds:    None       Current Nutrition Therapies:   DIET CARDIAC Regular    Anthropometric Measures:  Height:  5' 2\" (157.5 cm)  Current Body Wt:  49.8 kg (109 lb 12.6 oz)   Admission Body Wt:       Usual Body Wt:  120 lb     Ideal Body Wt:  118 lbs:  93 %   Adjusted Body Weight:  116.3; Weight Adjustment for: Amputation   Adjusted BMI:  21.3    BMI Category:  Normal weight (BMI 22.0-24.9) age over 72       Nutrition Diagnosis:   (P) Unintended weight loss related to (P) other (specify)(fluid status and paracentesis) as evidenced by (P) weight loss greater than or equal to 2% in 1 week    Nutrition Interventions:   Food and/or Nutrient Delivery: (P) Continue current diet  Nutrition Education and Counseling: (P) No recommendations at this time  Coordination of Nutrition Care: (P) Continued inpatient monitoring    Goals:  (P) Pt to continue to meet estimated needs via PO Intakes >75% throughout the next 5 days       Nutrition Monitoring and Evaluation:   Behavioral-Environmental Outcomes:    Food/Nutrient Intake Outcomes: (P) Food and nutrient intake  Physical Signs/Symptoms Outcomes: (P) Biochemical data, GI status, Weight    Discharge Planning:    (P) Continue current diet Electronically signed by Tank Alcantara on 9/21/2020 at 7:58 AM

## 2020-09-21 NOTE — PROGRESS NOTES
Hospitalist Progress Note    Patient: Bereket Anderson MRN: [id number]  CSN: [id number]    YOB: 1955  Age: 60 y.o. Sex: male    DOA: 9/17/2020 LOS:  LOS: 4 days          Chief Complaint:    CHF      Assessment/Plan     Advanced systolic CHF  Chronic liver disease  Ascites s/p paracentesis  Severe hypothyroidism, hx thyroid ca and thyroidectomy,  on admission, was off meds  BKA POA  Mild rhabdomyolysis  Hx schizophrenia-mentation very appropriate here, no behavioral issues  CKD stage 3    Continue current lasix as PO  Continue synthroid    Ammonia has been nl, can stop daily checks    ? ischemia workup and lifevest possibly if he agrees    D/c planning otherwise  Disposition :  Patient Active Problem List   Diagnosis Code    Chronic hepatitis C (HCC) B18.2    S/P BKA (below knee amputation) ([facility] [address] Z89.519    Paranoid schizophrenia, subchronic condition ([facility] [address] F20.0    Severe hypothyroidism E03.8    Pulmonary edema J81.1    Acute on chronic combined systolic and diastolic CHF, NYHA class 4 (HCC) I50.43       Subjective:    I feel good, can I leave? Denies SOB, CP, HA, nausea    Review of systems:    Constitutional: denies fevers, chills  Respiratory: denies SOB  Cardiovascular: denies chest pain  Gastrointestinal: denies nausea, vomiting, diarrhea      Vital signs/Intake and Output:  Visit Vitals  /73   Pulse 80   Temp 98 °F (36.7 °C)   Resp 16   Ht 5' 2\" (1.575 m)   Wt 49.8 kg (109 lb 11.2 oz)   SpO2 100%   BMI 20.06 kg/m²     Current Shift:  09/21 0701 - 09/21 1900  In: 240 [P.O.:240]  Out: -   Last three shifts:  09/19 1901 - 09/21 0700  In: 2320 [P.O.:2220;  I.V.:100]  Out: 3010 [Urine:3010]    Exam:    General: thin  HM, alert, NAD, OX3  Head/Neck: NCAT, supple, No masses, No lymphadenopathy  CVS:Regular rate and rhythm, no M/R/G, S1/S2 heard, no thrill  Lungs:Clear to auscultation bilaterally, no wheezes, rhonchi, or rales  Abdomen: Soft, Nontender, No distention, Normal Bowel sounds, No hepatomegaly  Extremities: BKA unilateral  Neuro:grossly normal , follows commands  Psych:appropriate                Labs: Results:       Chemistry Recent Labs     09/20/20  0050 09/19/20  0350   GLU 81 78    139   K 4.0 3.7    103   CO2 33* 30   BUN 22* 18   CREA 1.86* 1.73*   CA 9.2 8.7   AGAP 7 6   BUCR 12 10*   AP 29* 22*   TP 6.8 6.4   ALB 3.9 3.8   GLOB 2.9 2.6   AGRAT 1.3 1.5      CBC w/Diff Recent Labs     09/20/20  0050 09/19/20  0350   WBC 8.0 6.4   RBC 3.91* 3.69*   HGB 12.4* 11.7*   HCT 37.6 36.6    197   GRANS 73 65   LYMPH 20* 28   EOS 1 1      Cardiac Enzymes No results for input(s): CPK, CKND1, YANETH in the last 72 hours. No lab exists for component: CKRMB, TROIP   Coagulation Recent Labs     09/21/20  0520 09/20/20  0050   PTP 13.0 13.4   INR 1.0 1.0       Lipid Panel No results found for: CHOL, CHOLPOCT, CHOLX, CHLST, CHOLV, 369552, HDL, HDLP, LDL, LDLC, DLDLP, 799783, VLDLC, VLDL, TGLX, TRIGL, TRIGP, TGLPOCT, CHHD, CHHDX   BNP No results for input(s): BNPP in the last 72 hours.    Liver Enzymes Recent Labs     09/20/20  0050   TP 6.8   ALB 3.9   AP 29*      Thyroid Studies Lab Results   Component Value Date/Time    .00 (H) 09/17/2020 01:32 PM        Procedures/imaging: see electronic medical records for all procedures/Xrays and details which were not copied into this note but were reviewed prior to creation of Cordell Rouse MD

## 2020-09-21 NOTE — PROGRESS NOTES
Assumed are of pt from [place]. Pt seen by cardiology patient stated he was going for stress tomorrow. During rounds it was mentioned pt may  Also get life vest is pt agrees. Pt had uneventful shift.

## 2020-09-21 NOTE — PROGRESS NOTES
Problem: Falls - Risk of  Goal: *Absence of Falls  Description: Document Amilcar Alvares Fall Risk and appropriate interventions in the flowsheet. Outcome: Progressing Towards Goal  Note: Fall Risk Interventions:  Mobility Interventions: Assess mobility with egress test, Patient to call before getting OOB, Utilize walker, cane, or other assistive device         Medication Interventions: Evaluate medications/consider consulting pharmacy, Patient to call before getting OOB, Teach patient to arise slowly    Elimination Interventions: Call light in reach, Patient to call for help with toileting needs, Urinal in reach              Problem: Patient Education: Go to Patient Education Activity  Goal: Patient/Family Education  Outcome: Progressing Towards Goal     Problem: Pain  Goal: *Control of Pain  Outcome: Progressing Towards Goal     Problem: Patient Education: Go to Patient Education Activity  Goal: Patient/Family Education  Outcome: Progressing Towards Goal     Problem: Fluid Volume - Risk of, Imbalanced  Goal: *Balanced intake and output  Outcome: Progressing Towards Goal     Problem: Patient Education: Go to Patient Education Activity  Goal: Patient/Family Education  Outcome: Progressing Towards Goal     Problem: Pressure Injury - Risk of  Goal: *Prevention of pressure injury  Description: Document Frederic Scale and appropriate interventions in the flowsheet.   Outcome: Progressing Towards Goal  Note: Pressure Injury Interventions:  Sensory Interventions: Assess changes in LOC    Moisture Interventions: Absorbent underpads, Minimize layers, Moisture barrier, Maintain skin hydration (lotion/cream)    Activity Interventions: Pressure redistribution bed/mattress(bed type)    Mobility Interventions: HOB 30 degrees or less, Pressure redistribution bed/mattress (bed type)    Nutrition Interventions: Document food/fluid/supplement intake                     Problem: Patient Education: Go to Patient Education Activity  Goal: Patient/Family Education  Outcome: Progressing Towards Goal     Problem: Patient Education: Go to Patient Education Activity  Goal: Patient/Family Education  Outcome: Progressing Towards Goal

## 2020-09-21 NOTE — PROGRESS NOTES
Noemi Brady Hagen 281 care from Jose Osman RN. Shift uneventful.    0700 Bedside and Verbal shift change report given to Yeni Mcguire RN   (oncoming nurse) by Ángela Ortega RN (offgoing nurse). Report included the following information SBAR, Kardex, ED Summary, Procedure Summary, Intake/Output, MAR, Recent Results and Med Rec Status. [facility] Card[facility]/Medical Night Shift Chart Audit    Chart Audit completed?  YES

## 2020-09-21 NOTE — PROGRESS NOTES
Problem: Mobility Impaired (Adult and Pediatric)  Goal: *Acute Goals and Plan of Care (Insert Text)  Description: Physical Therapy Goals   Initiated 9/18/2020 and to be accomplished within 3-5 day(s)  1. Patient will move from supine <> sit with S in prep for out of bed activity and change of position. 2.  Patient will perform sit<> stand with S with LRAD and prosthetic Lower limb in place in prep for transfers/ambulation. 3.  Patient will transfer from bed <> chair with S  with LRAD and prosthetic Lower limb in place for time up in chair for completion of ADL activity. 4.  Patient will ambulate 50 feet with S/LRAD and prosthetic Lower limb in place for improved functional mobility at discharge. 5.  Patient will ascend/descend 3-5 stairs with handrail(s) with minimal assistance/contact guard assist for home re-entry as needed. Outcome: Progressing Towards Goal  PHYSICAL THERAPY TREATMENT    Patient: Chetan Delcid (87 y.o. male)  Date: 9/21/2020  Diagnosis: Pulmonary edema [J81.1]  Severe hypothyroidism [E03.8]   <principal problem not specified>  Precautions: Fall, Other (comment)(Prosthetic L Lower Limb)   Chart, physical therapy assessment, plan of care and goals were reviewed. ASSESSMENT:  Pt up in chair, dressed in street clothes, with prosthesis on on PT arrival. Reports no pain. Transfers completed with supervision/RW (goal met). Pt able to increase gt distance to 100ft with deviations as noted below with RW/CGA. Noted audibile sound of residual limb rising in/out of socket (servicing required). Sound decreased after pt adjust pants by tightening belt. Pt returned to room and left up in chair in NAD with all needs in reach and nurse Brisa made aware. Will continue for step nego and goals as above.    Progression toward goals:  [x]      Improving appropriately and progressing toward goals  []      Improving slowly and progressing toward goals  []      Not making progress toward goals and plan of care will be adjusted     PLAN:  Patient continues to benefit from skilled intervention to address the above impairments. Continue treatment per established plan of care. Discharge Recommendations:  Home Health and Prosthetist to service L LE prosthetic limb due to poor fit  Further Equipment Recommendations for Discharge:  N/A     SUBJECTIVE:   Patient stated Good.     OBJECTIVE DATA SUMMARY:   Critical Behavior:  Neurologic State: Alert  Orientation Level: Oriented X4  Cognition: Follows commands  Safety/Judgement: Awareness of environment, Fall prevention  Functional Mobility Training:  Transfers:  Sit to Stand: Supervision  Stand to Sit: Supervision  Balance:  Sitting: Intact  Standing: Intact; With support  Standing - Static: Fair  Standing - Dynamic : Fair  Ambulation/Gait Training:  Distance (ft): 100 Feet (ft)  Assistive Device: Gait belt;Prosthetic device; Walker, rolling(L LE prosthesis)  Ambulation - Level of Assistance: Contact guard assistance  Gait Abnormalities: Decreased step clearance; Step to gait  Stance: Right increased  Speed/Brigitte: Pace decreased (<100 feet/min)  Step Length: Right shortened;Left shortened  Pain:  Pain Scale 1: Numeric (0 - 10)  Pain Intensity 1: 0  Activity Tolerance:   Good   Please refer to the flowsheet for vital signs taken during this treatment.   After treatment:   [x] Patient left in no apparent distress sitting up in chair  [] Patient left in no apparent distress in bed  [x] Call bell left within reach  [x] Nursing notified  [] Caregiver present  [] Bed alarm activated      Bill Avendano PT   Time Calculation: 14 mins

## 2020-09-21 NOTE — PROGRESS NOTES
Chart reviewed and spoke with pt at  Bedside along with conversation with patients sister Caro [employer] [phone] after permission from patient,pt alert and oriented to name,place and time pt denies having living will or POA pt stated he makes his own decisions but cm can update sister on medical condition and d/c planning,PT did recommend H/H services pt accepted [address] provided for [facility], pt has home walking cane, prior to covid pandemic pt drove self to all appointments, pt stated his brother does assist with care at times,cm will cont to review for any additional d/c needs. Care Management Interventions  PCP Verified by CM:  Yes  Palliative Care Criteria Met (RRAT>21 & CHF Dx)?: No  Transition of Care Consult (CM Consult): [address]: No  Reason Outside [place]: Patient already serviced by other home care/hospice agency([facility])  Physical Therapy Consult: Yes  Occupational Therapy Consult: Yes  Current Support Network: Lives Alone  The Patient and/or Patient Representative was Provided with a Choice of Provider and Agrees with the Discharge Plan?: Yes  Freedom of Choice List was Provided with Basic Dialogue that Supports the Patient's Individualized Plan of Care/Goals, Treatment Preferences and Shares the Quality Data Associated with the Providers?: Yes

## 2020-09-22 ENCOUNTER — APPOINTMENT (OUTPATIENT)
Dept: NON INVASIVE DIAGNOSTICS | Age: 65
DRG: 291 | End: 2020-09-22
Attending: INTERNAL MEDICINE
Payer: MEDICARE

## 2020-09-22 ENCOUNTER — APPOINTMENT (OUTPATIENT)
Dept: NUCLEAR MEDICINE | Age: 65
DRG: 291 | End: 2020-09-22
Attending: INTERNAL MEDICINE
Payer: MEDICARE

## 2020-09-22 LAB
ANION GAP SERPL CALC-SCNC: 9 MMOL/L (ref 3–18)
BUN SERPL-MCNC: 28 MG/DL (ref 7–18)
BUN/CREAT SERPL: 15 (ref 12–20)
CALCIUM SERPL-MCNC: 9.1 MG/DL (ref 8.5–10.1)
CHLORIDE SERPL-SCNC: 96 MMOL/L (ref 100–111)
CO2 SERPL-SCNC: 31 MMOL/L (ref 21–32)
CREAT SERPL-MCNC: 1.89 MG/DL (ref 0.6–1.3)
ERYTHROCYTE [DISTWIDTH] IN BLOOD BY AUTOMATED COUNT: 16.2 % (ref 11.6–14.5)
GLUCOSE SERPL-MCNC: 83 MG/DL (ref 74–99)
HCT VFR BLD AUTO: 34.1 % (ref 36–48)
HGB BLD-MCNC: 11.5 G/DL (ref 13–16)
MCH RBC QN AUTO: 32.1 PG (ref 24–34)
MCHC RBC AUTO-ENTMCNC: 33.7 G/DL (ref 31–37)
MCV RBC AUTO: 95.3 FL (ref 74–97)
PLATELET # BLD AUTO: 173 K/UL (ref 135–420)
PMV BLD AUTO: 13.5 FL (ref 9.2–11.8)
POTASSIUM SERPL-SCNC: 4.2 MMOL/L (ref 3.5–5.5)
RBC # BLD AUTO: 3.58 M/UL (ref 4.7–5.5)
SODIUM SERPL-SCNC: 136 MMOL/L (ref 136–145)
STRESS BASELINE HR: 78 BPM
STRESS ESTIMATED WORKLOAD: 1 METS
STRESS EXERCISE DUR MIN: NORMAL
STRESS PEAK DIAS BP: 96 MMHG
STRESS PEAK SYS BP: 139 MMHG
STRESS PERCENT HR ACHIEVED: 70 %
STRESS POST PEAK HR: 109 BPM
STRESS RATE PRESSURE PRODUCT: NORMAL BPM*MMHG
STRESS ST DEPRESSION: 0 MM
STRESS ST ELEVATION: 0 MM
STRESS TARGET HR: 156 BPM
WBC # BLD AUTO: 5.9 K/UL (ref 4.6–13.2)

## 2020-09-22 PROCEDURE — 80048 BASIC METABOLIC PNL TOTAL CA: CPT

## 2020-09-22 PROCEDURE — 74011250636 HC RX REV CODE- 250/636: Performed by: INTERNAL MEDICINE

## 2020-09-22 PROCEDURE — 36415 COLL VENOUS BLD VENIPUNCTURE: CPT

## 2020-09-22 PROCEDURE — 93017 CV STRESS TEST TRACING ONLY: CPT

## 2020-09-22 PROCEDURE — 97116 GAIT TRAINING THERAPY: CPT

## 2020-09-22 PROCEDURE — 65660000000 HC RM CCU STEPDOWN

## 2020-09-22 PROCEDURE — 85027 COMPLETE CBC AUTOMATED: CPT

## 2020-09-22 PROCEDURE — 74011250637 HC RX REV CODE- 250/637: Performed by: HOSPITALIST

## 2020-09-22 PROCEDURE — 74011250637 HC RX REV CODE- 250/637: Performed by: INTERNAL MEDICINE

## 2020-09-22 PROCEDURE — 97530 THERAPEUTIC ACTIVITIES: CPT

## 2020-09-22 PROCEDURE — 78452 HT MUSCLE IMAGE SPECT MULT: CPT

## 2020-09-22 RX ORDER — ISOSORBIDE DINITRATE 5 MG/1
2.5 TABLET ORAL 2 TIMES DAILY
Status: DISCONTINUED | OUTPATIENT
Start: 2020-09-22 | End: 2020-09-24

## 2020-09-22 RX ORDER — METOPROLOL SUCCINATE 25 MG/1
25 TABLET, EXTENDED RELEASE ORAL DAILY
Status: DISCONTINUED | OUTPATIENT
Start: 2020-09-22 | End: 2020-09-24 | Stop reason: HOSPADM

## 2020-09-22 RX ADMIN — FUROSEMIDE 40 MG: 40 TABLET ORAL at 11:09

## 2020-09-22 RX ADMIN — ISOSORBIDE DINITRATE 2.5 MG: 5 TABLET ORAL at 21:00

## 2020-09-22 RX ADMIN — Medication 10 ML: at 23:16

## 2020-09-22 RX ADMIN — RISPERIDONE 0.5 MG: 0.25 TABLET ORAL at 20:32

## 2020-09-22 RX ADMIN — PANTOPRAZOLE SODIUM 40 MG: 40 TABLET, DELAYED RELEASE ORAL at 06:53

## 2020-09-22 RX ADMIN — ENOXAPARIN SODIUM 30 MG: 40 INJECTION SUBCUTANEOUS at 11:09

## 2020-09-22 RX ADMIN — LEVOTHYROXINE SODIUM 125 MCG: 0.03 TABLET ORAL at 11:09

## 2020-09-22 RX ADMIN — RISPERIDONE 0.5 MG: 0.25 TABLET ORAL at 11:09

## 2020-09-22 RX ADMIN — Medication 10 ML: at 06:53

## 2020-09-22 RX ADMIN — Medication 10 ML: at 20:32

## 2020-09-22 RX ADMIN — METOPROLOL SUCCINATE 25 MG: 25 TABLET, EXTENDED RELEASE ORAL at 11:09

## 2020-09-22 RX ADMIN — Medication 5 ML: at 14:00

## 2020-09-22 RX ADMIN — REGADENOSON 0.4 MG: 0.08 INJECTION, SOLUTION INTRAVENOUS at 09:45

## 2020-09-22 NOTE — PROGRESS NOTES
Problem: Falls - Risk of  Goal: *Absence of Falls  Description: Document Bruce Albert Fall Risk and appropriate interventions in the flowsheet. Outcome: Progressing Towards Goal  Note: Fall Risk Interventions:  Mobility Interventions: Communicate number of staff needed for ambulation/transfer, PT Consult for mobility concerns, PT Consult for assist device competence         Medication Interventions: Patient to call before getting OOB    Elimination Interventions: Call light in reach, Urinal in reach              Problem: Patient Education: Go to Patient Education Activity  Goal: Patient/Family Education  Outcome: Progressing Towards Goal     Problem: Pain  Goal: *Control of Pain  Outcome: Progressing Towards Goal     Problem: Patient Education: Go to Patient Education Activity  Goal: Patient/Family Education  Outcome: Progressing Towards Goal     Problem: Fluid Volume - Risk of, Imbalanced  Goal: *Balanced intake and output  Outcome: Progressing Towards Goal     Problem: Patient Education: Go to Patient Education Activity  Goal: Patient/Family Education  Outcome: Progressing Towards Goal     Problem: Pressure Injury - Risk of  Goal: *Prevention of pressure injury  Description: Document Frederic Scale and appropriate interventions in the flowsheet.   Outcome: Progressing Towards Goal  Note: Pressure Injury Interventions:  Sensory Interventions: Assess need for specialty bed, Assess changes in LOC, Maintain/enhance activity level, Minimize linen layers, Keep linens dry and wrinkle-free, Pressure redistribution bed/mattress (bed type)    Moisture Interventions: Absorbent underpads    Activity Interventions: Pressure redistribution bed/mattress(bed type), PT/OT evaluation    Mobility Interventions: Pressure redistribution bed/mattress (bed type), PT/OT evaluation    Nutrition Interventions: Offer support with meals,snacks and hydration, Document food/fluid/supplement intake                     Problem: Patient Education: Go to Patient Education Activity  Goal: Patient/Family Education  Outcome: Progressing Towards Goal     Problem: Patient Education: Go to Patient Education Activity  Goal: Patient/Family Education  Outcome: Progressing Towards Goal     Problem: Patient Education: Go to Patient Education Activity  Goal: Patient/Family Education  Outcome: Progressing Towards Goal     Problem: Heart Failure: Day 1  Goal: Off Pathway (Use only if patient is Off Pathway)  Outcome: Progressing Towards Goal  Goal: Activity/Safety  Outcome: Progressing Towards Goal  Goal: Consults, if ordered  Outcome: Progressing Towards Goal  Goal: Diagnostic Test/Procedures  Outcome: Progressing Towards Goal  Goal: Nutrition/Diet  Outcome: Progressing Towards Goal  Goal: Discharge Planning  Outcome: Progressing Towards Goal  Goal: Medications  Outcome: Progressing Towards Goal  Goal: Respiratory  Outcome: Progressing Towards Goal  Goal: Treatments/Interventions/Procedures  Outcome: Progressing Towards Goal  Goal: Psychosocial  Outcome: Progressing Towards Goal  Goal: *Oxygen saturation within defined limits  Outcome: Progressing Towards Goal  Goal: *Hemodynamically stable  Outcome: Progressing Towards Goal  Goal: *Optimal pain control at patient's stated goal  Outcome: Progressing Towards Goal  Goal: *Anxiety reduced or absent  Outcome: Progressing Towards Goal     Problem: Heart Failure: Day 2  Goal: Off Pathway (Use only if patient is Off Pathway)  Outcome: Progressing Towards Goal  Goal: Activity/Safety  Outcome: Progressing Towards Goal  Goal: Consults, if ordered  Outcome: Progressing Towards Goal  Goal: Diagnostic Test/Procedures  Outcome: Progressing Towards Goal  Goal: Nutrition/Diet  Outcome: Progressing Towards Goal  Goal: Discharge Planning  Outcome: Progressing Towards Goal  Goal: Medications  Outcome: Progressing Towards Goal  Goal: Respiratory  Outcome: Progressing Towards Goal  Goal: Treatments/Interventions/Procedures  Outcome: Progressing Towards Goal  Goal: Psychosocial  Outcome: Progressing Towards Goal  Goal: *Oxygen saturation within defined limits  Outcome: Progressing Towards Goal  Goal: *Hemodynamically stable  Outcome: Progressing Towards Goal  Goal: *Optimal pain control at patient's stated goal  Outcome: Progressing Towards Goal  Goal: *Anxiety reduced or absent  Outcome: Progressing Towards Goal  Goal: *Demonstrates progressive activity  Outcome: Progressing Towards Goal     Problem: Heart Failure: Day 3  Goal: Off Pathway (Use only if patient is Off Pathway)  Outcome: Progressing Towards Goal  Goal: Activity/Safety  Outcome: Progressing Towards Goal  Goal: Diagnostic Test/Procedures  Outcome: Progressing Towards Goal  Goal: Nutrition/Diet  Outcome: Progressing Towards Goal  Goal: Discharge Planning  Outcome: Progressing Towards Goal  Goal: Medications  Outcome: Progressing Towards Goal  Goal: Respiratory  Outcome: Progressing Towards Goal  Goal: Treatments/Interventions/Procedures  Outcome: Progressing Towards Goal  Goal: Psychosocial  Outcome: Progressing Towards Goal  Goal: *Oxygen saturation within defined limits  Outcome: Progressing Towards Goal  Goal: *Hemodynamically stable  Outcome: Progressing Towards Goal  Goal: *Optimal pain control at patient's stated goal  Outcome: Progressing Towards Goal  Goal: *Anxiety reduced or absent  Outcome: Progressing Towards Goal  Goal: *Demonstrates progressive activity  Outcome: Progressing Towards Goal     Problem: Heart Failure: Day 4  Goal: Off Pathway (Use only if patient is Off Pathway)  Outcome: Progressing Towards Goal  Goal: Activity/Safety  Outcome: Progressing Towards Goal  Goal: Diagnostic Test/Procedures  Outcome: Progressing Towards Goal  Goal: Nutrition/Diet  Outcome: Progressing Towards Goal  Goal: Discharge Planning  Outcome: Progressing Towards Goal  Goal: Medications  Outcome: Progressing Towards Goal  Goal: Respiratory  Outcome: Progressing Towards Goal  Goal: Treatments/Interventions/Procedures  Outcome: Progressing Towards Goal  Goal: Psychosocial  Outcome: Progressing Towards Goal  Goal: *Oxygen saturation within defined limits  Outcome: Progressing Towards Goal  Goal: *Hemodynamically stable  Outcome: Progressing Towards Goal  Goal: *Optimal pain control at patient's stated goal  Outcome: Progressing Towards Goal  Goal: *Anxiety reduced or absent  Outcome: Progressing Towards Goal  Goal: *Demonstrates progressive activity  Outcome: Progressing Towards Goal     Problem: Heart Failure: Day 5  Goal: Off Pathway (Use only if patient is Off Pathway)  Outcome: Progressing Towards Goal  Goal: Activity/Safety  Outcome: Progressing Towards Goal  Goal: Diagnostic Test/Procedures  Outcome: Progressing Towards Goal  Goal: Nutrition/Diet  Outcome: Progressing Towards Goal  Goal: Discharge Planning  Outcome: Progressing Towards Goal  Goal: Medications  Outcome: Progressing Towards Goal  Goal: Respiratory  Outcome: Progressing Towards Goal  Goal: Treatments/Interventions/Procedures  Outcome: Progressing Towards Goal  Goal: Psychosocial  Outcome: Progressing Towards Goal     Problem: Heart Failure: Discharge Outcomes  Goal: *Demonstrates ability to perform prescribed activity without shortness of breath or discomfort  Outcome: Progressing Towards Goal  Goal: *Left ventricular function assessment completed prior to or during stay, or planned for post-discharge  Outcome: Progressing Towards Goal  Goal: *ACEI prescribed if LVEF less than 40% and no contraindications or ARB prescribed  Outcome: Progressing Towards Goal  Goal: *Verbalizes understanding and describes prescribed diet  Outcome: Progressing Towards Goal  Goal: *Verbalizes understanding/describes prescribed medications  Outcome: Progressing Towards Goal  Goal: *Describes available resources and support systems  Description: (eg: Home Health, Palliative Care, Advanced Medical Directive)  Outcome: Progressing Towards Goal  Goal: *Describes smoking cessation resources  Outcome: Progressing Towards Goal  Goal: *Understands and describes signs and symptoms to report to providers(Stroke Metric)  Outcome: Progressing Towards Goal  Goal: *Describes/verbalizes understanding of follow-up/return appt  Description: (eg: to physicians, diabetes treatment coordinator, and other resources  Outcome: Progressing Towards Goal  Goal: *Describes importance of continuing daily weights and changes to report to physician  Outcome: Progressing Towards Goal

## 2020-09-22 NOTE — PROGRESS NOTES
1900: Bedside and Verbal shift change report given to DEIDRE Rider, RN (oncoming nurse) by CAPRICE Caceres (offgoing nurse). Report included the following information SBAR, Kardex, Procedure Summary, Intake/Output and MAR. Assumed care of patient. 0000: Patient is now NPO    0330: 3 [facility] Cardiac/Medical Night Shift Chart Audit    Chart Audit completed? YES    0700: Shift summary: shift uneventful, No complaints of chest pain or shortness of breath. Bedside shift change report given to YAYO Moran (oncoming nurse) by DEIDRE Rider, COURTNEY (offgoing nurse). Report included the following information SBAR, Kardex, Procedure Summary, Intake/Output and MAR.      Joyce Tate

## 2020-09-22 NOTE — PROGRESS NOTES
Bedside and Verbal shift change report given to Lavaughn Cheadle RN (oncoming nurse) by Radha Murray RN (offgoing nurse). Report included the following information SBAR, Kardex, Intake/Output, MAR and Recent Results. 0800 PT off unit for stress test.   1030 Shift assessment complete. Shift summary - Shift uneventful with no c/o chest pain. Bedside and Verbal shift change report given to Radha Murray RN (oncoming nurse) by Lavaughn Cheadle RN (offgoing nurse). Report included the following information SBAR, Kardex, Intake/Output, MAR and Recent Results.

## 2020-09-22 NOTE — PROGRESS NOTES
Cardiology Progress Note        Patient: Ellie Boyd        Sex: male          DOA: 9/17/2020  YOB: 1955      Age:  60 y.o.        LOS:  LOS: 5 days   Assessment/Plan     Patient Active Problem List   Diagnosis Code    Chronic hepatitis C (HCC) B18.2    S/P BKA (below knee amputation) ([facility] Z89.519    Paranoid schizophrenia, subchronic condition ([facility] F20.0    Severe hypothyroidism E03.8    Pulmonary edema J81.1    Acute on chronic combined systolic and diastolic CHF, NYHA class II/III (HCC) I50.43      Acute decompensated systolic heart failure LVEF 15-20%     Echocardiogram revealed    · Left Ventricle: Normal cavity size and wall thickness. The estimated EF is 15 - 20%. Severely reduced systolic function. There is severe (grade 3) left ventricular diastolic dysfunction E/E' ratio is 13.19. There is mild spontaneous echo contrast. Wall Scoring: The left ventricular wall motion is globally hypokinetic. · Tricuspid Valve: Normal valve structure and no stenosis. Tricuspid regurgitation is inadequate for estimation of right ventricular systolic pressure. · Pericardium: Small circumferential pericardial effusion noted. The effusion measures 10 mm. No indications of tamponade present. There is left pleural effusion. There is right pleural effusion.     Plan:    Start Metoprolol succinate 25 mg once a day. Continue Lasix 40 mg once a day  And low-dose of ACE inhibitor/ARB if blood pressure and renal function permits  Salt restriction up to 2 g per day and fluid restriction up to 1.2 L per day  Continue management as per hospital medicine  Schedule for Lexiscan stress for SOBOE and newly diagnosed systolic heart failure to look for ischemic substrate. Subjective:    cc: My breathing is better       REVIEW OF SYSTEMS:     General: No fevers or chills.   Cardiovascular: No chest pain,No palpitations, No orthopnea, No PND, Positive shortness of breath, leg swelling, No claudication  Pulmonary: No dyspnea. Gastrointestinal: No nausea, vomiting, bleeding  Neurology: No Dizziness    Objective:      Visit Vitals  /82   Pulse 75   Temp 97.9 °F (36.6 °C)   Resp 16   Ht 5' 2\" (1.575 m)   Wt 56.7 kg (125 lb)   SpO2 99%   BMI 22.86 kg/m²     Body mass index is 22.86 kg/m². Physical Exam:  General Appearance: Comfortable, not using accessory muscles of respiration. HEENT: SALENA. HEAD: Atraumatic  NECK: No JVD, no thyroidomeglay. CAROTIDS: No bruit  LUNGS: Clear bilaterally. HEART: S1+S2 audible, no murmur, no pericardial rub. ABD: Non-tender, BS Audible    EXT: RIGHT lower extremity trace edema, and no cysnosis. , LEFT above-knee amputation  VASCULAR EXAM: Pulses are intact. PSYCHIATRIC EXAM: Mood is appropriate. MUSCULOSKELETAL: Grossly no joint deformity.   NEUROLOGICAL: Alert, follows verbal commands, No motor and sensory deficit  Medication:  Current Facility-Administered Medications   Medication Dose Route Frequency    metoprolol succinate (TOPROL-XL) XL tablet 25 mg  25 mg Oral DAILY    furosemide (LASIX) tablet 40 mg  40 mg Oral DAILY    pantoprazole (PROTONIX) tablet 40 mg  40 mg Oral ACB    risperiDONE (RisperDAL) tablet 0.5 mg  0.5 mg Oral BID    enoxaparin (LOVENOX) injection 30 mg  30 mg SubCUTAneous DAILY    levothyroxine (SYNTHROID) tablet 125 mcg  125 mcg Oral DAILY    sodium chloride (NS) flush 5-40 mL  5-40 mL IntraVENous Q8H    sodium chloride (NS) flush 5-40 mL  5-40 mL IntraVENous PRN    acetaminophen (TYLENOL) tablet 650 mg  650 mg Oral Q6H PRN    Or    acetaminophen (TYLENOL) suppository 650 mg  650 mg Rectal Q6H PRN    polyethylene glycol (MIRALAX) packet 17 g  17 g Oral DAILY PRN               Lab/Data Reviewed:       Recent Labs     09/20/20  0050 09/19/20  0350   WBC 8.0 6.4   HGB 12.4* 11.7*   HCT 37.6 36.6    197     Recent Labs     09/20/20  0050 09/19/20  0350    139   K 4.0 3.7    103   CO2 33* 30   GLU 81 78   BUN 22* 18   CREA 1.86* 1.73*   CA 9.2 8.7       Signed By: Ken Moulton MD     September 21, 2020

## 2020-09-22 NOTE — PROGRESS NOTES
Cardiology Progress Note        Patient: Michael Barnes        Sex: male          DOA: 9/17/2020  YOB: 1955      Age:  60 y.o.        LOS:  LOS: 5 days   Assessment/Plan     Patient Active Problem List   Diagnosis Code    Chronic hepatitis C (HCC) B18.2    S/P BKA (below knee amputation) ([facility] [facility] Z89.519    Paranoid schizophrenia, subchronic condition ([facility] F20.0    Severe hypothyroidism E03.8    Pulmonary edema J81.1    Acute on chronic combined systolic and diastolic CHF, NYHA class II/III (HCC) I50.43      Acute decompensated systolic heart failure LVEF 15-20%     Echocardiogram revealed    · Left Ventricle: Normal cavity size and wall thickness. The estimated EF is 15 - 20%. Severely reduced systolic function. There is severe (grade 3) left ventricular diastolic dysfunction E/E' ratio is 13.19. There is mild spontaneous echo contrast. Wall Scoring: The left ventricular wall motion is globally hypokinetic. · Tricuspid Valve: Normal valve structure and no stenosis. Tricuspid regurgitation is inadequate for estimation of right ventricular systolic pressure. · Pericardium: Small circumferential pericardial effusion noted. The effusion measures 10 mm. No indications of tamponade present. There is left pleural effusion. There is right pleural effusion.     Plan:    Continue Metoprolol succinate 25 mg once a day. Continue Lasix 40 mg once a day  Start Isosorbide dinitrate 2.5 mg twice a day. And low-dose of ACE inhibitor/ARB if blood pressure and renal function permits  Advised about lifevest   Salt restriction up to 2 g per day and fluid restriction up to 1.2 L per day  Continue management as per hospital medicine  Further recommendation to follow after stress test.               Subjective:    cc: My breathing is better       REVIEW OF SYSTEMS:     General: No fevers or chills.   Cardiovascular: No chest pain,No palpitations, No orthopnea, No PND, Positive shortness of breath, leg swelling, No claudication  Pulmonary: No dyspnea. Gastrointestinal: No nausea, vomiting, bleeding  Neurology: No Dizziness    Objective:      Visit Vitals  BP (!) 117/93   Pulse 72   Temp 97.7 °F (36.5 °C)   Resp 18   Ht 5' 2\" (1.575 m)   Wt 56.7 kg (125 lb)   SpO2 100%   BMI 22.86 kg/m²     Body mass index is 22.86 kg/m². Physical Exam:  General Appearance: Comfortable, not using accessory muscles of respiration. HEENT: SALENA. HEAD: Atraumatic  NECK: No JVD, no thyroidomeglay. CAROTIDS: No bruit  LUNGS: Clear bilaterally. HEART: S1+S2 audible, no murmur, no pericardial rub. ABD: Non-tender, BS Audible    EXT: RIGHT lower extremity trace edema, and no cysnosis. , LEFT above-knee amputation  VASCULAR EXAM: Pulses are intact. PSYCHIATRIC EXAM: Mood is appropriate. MUSCULOSKELETAL: Grossly no joint deformity.   NEUROLOGICAL: Alert, follows verbal commands, No motor and sensory deficit  Medication:  Current Facility-Administered Medications   Medication Dose Route Frequency    metoprolol succinate (TOPROL-XL) XL tablet 25 mg  25 mg Oral DAILY    isosorbide dinitrate (ISORDIL) tablet 2.5 mg  2.5 mg Oral BID    furosemide (LASIX) tablet 40 mg  40 mg Oral DAILY    pantoprazole (PROTONIX) tablet 40 mg  40 mg Oral ACB    risperiDONE (RisperDAL) tablet 0.5 mg  0.5 mg Oral BID    enoxaparin (LOVENOX) injection 30 mg  30 mg SubCUTAneous DAILY    levothyroxine (SYNTHROID) tablet 125 mcg  125 mcg Oral DAILY    sodium chloride (NS) flush 5-40 mL  5-40 mL IntraVENous Q8H    sodium chloride (NS) flush 5-40 mL  5-40 mL IntraVENous PRN    acetaminophen (TYLENOL) tablet 650 mg  650 mg Oral Q6H PRN    Or    acetaminophen (TYLENOL) suppository 650 mg  650 mg Rectal Q6H PRN    polyethylene glycol (MIRALAX) packet 17 g  17 g Oral DAILY PRN               Lab/Data Reviewed:       Recent Labs     09/22/20  0436 09/20/20  0050   WBC 5.9 8.0   HGB 11.5* 12.4*   HCT 34.1* 37.6    169     Recent Labs 09/22/20  0436 09/20/20  0050    140   K 4.2 4.0   CL 96* 100   CO2 31 33*   GLU 83 81   BUN 28* 22*   CREA 1.89* 1.86*   CA 9.1 9.2       Signed By: Miles Chang MD     September 21, 2020

## 2020-09-22 NOTE — PROGRESS NOTES
DC Plan: Discharge home with [facility], MD follow up, family assistance once medically stable. Life Vest?    Chart reviewed. Pt admitted to tele. No dc order noted. Pt covid neg 9/18. PT recommending HH. Pt has been arranged with [facility] per previous CM notes and [facility]. Pt has prosthetic. Noted in rounds pt may need life vest, clinicals faxed to [facility], will need order.   Ezekiel Delcid with [facility] notified of potential need for life vest.  CM will cont to follow for transition of care needs [phone]

## 2020-09-22 NOTE — PROGRESS NOTES
Problem: Mobility Impaired (Adult and Pediatric)  Goal: *Acute Goals and Plan of Care (Insert Text)  Description: Physical Therapy Goals   Initiated 9/18/2020 and to be accomplished within 3-5 day(s)  1. Patient will move from supine <> sit with S in prep for out of bed activity and change of position. 2.  Patient will perform sit<> stand with S with LRAD and prosthetic Lower limb in place in prep for transfers/ambulation. 3.  Patient will transfer from bed <> chair with S  with LRAD and prosthetic Lower limb in place for time up in chair for completion of ADL activity. 4.  Patient will ambulate 50 feet with S/LRAD and prosthetic Lower limb in place for improved functional mobility at discharge. 5.  Patient will ascend/descend 3-5 stairs with handrail(s) with minimal assistance/contact guard assist for home re-entry as needed. Outcome: Progressing Towards Goal   PHYSICAL THERAPY TREATMENT    Patient: Holden Guzman (69 y.o. male)  Date: 9/22/2020  Diagnosis: Pulmonary edema [J81.1]  Severe hypothyroidism [E03.8]   Acute on chronic combined systolic and diastolic CHF, NYHA class 4 ([facility])       Precautions: Fall, Other (comment)(Prosthetic L Lower Limb)   Chart, physical therapy assessment, plan of care and goals were reviewed. ASSESSMENT:  the patient is mobilizing well with use of RW and prosthetic. Pt with poor Prosthetic fitment and pistols with each step. Manual assist to required for stair amb to avoid device for fully doffing. Pt would benefit from nutrition increase and prosthetic adjustment. Progression toward goals:  []      Improving appropriately and progressing toward goals  [x]      Improving slowly and progressing toward goals  []      Not making progress toward goals and plan of care will be adjusted     PLAN:  Patient continues to benefit from skilled intervention to address the above impairments. Continue treatment per established plan of care.   Discharge Recommendations:  Home Health, Prosthetist appointment    Further Equipment Recommendations for Discharge:  bedside commode, rolling walker     SUBJECTIVE:   Patient stated I'm going home alone.     OBJECTIVE DATA SUMMARY:   Critical Behavior:  Neurologic State: Alert  Orientation Level: Oriented X4  Cognition: Follows commands  Safety/Judgement: Awareness of environment, Fall prevention  Functional Mobility Training:  Bed Mobility:  Rolling: Supervision  Supine to Sit: Supervision  Sit to Supine: Supervision  Transfers:  Sit to Stand: Supervision  Stand to Sit: Supervision  Balance:  Sitting: Intact  Standing: Intact; With support  Standing - Static: Good  Standing - Dynamic : Fair  Ambulation/Gait Training:  Distance (ft): 200 Feet (ft)  Assistive Device: Gait belt;Prosthetic device; Walker, rolling  Ambulation - Level of Assistance: Contact guard assistance  Gait Abnormalities: Decreased step clearance; Step to gait  Stance: Right increased  Speed/Brigitte: Pace decreased (<100 feet/min)  Step Length: Right shortened;Left shortened  Pain:  Pain Scale 1: Numeric (0 - 10)  Pain Intensity 1: 0  Pain out: 0  Activity Tolerance:   Fair   Please refer to the flowsheet for vital signs taken during this treatment.   After treatment:   [x] Patient left in no apparent distress sitting up in chair  [] Patient left in no apparent distress in bed  [x] Call bell left within reach  [x] Nursing notified  [] Caregiver present  [] Bed alarm activated      Constantino Sagastume PTA   Time Calculation: 41 mins

## 2020-09-22 NOTE — PROGRESS NOTES
Hospitalist Progress Note    Patient: Breana Harrell MRN: [id number]  CSN: [id number]    YOB: 1955  Age: 60 y.o. Sex: male    DOA: 9/17/2020 LOS:  LOS: 5 days          Chief Complaint:    CHF    Assessment/Plan     Advanced systolic CHF  Chronic liver disease  Ascites s/p paracentesis  Severe hypothyroidism, hx thyroid ca and thyroidectomy,  on admission, was off meds  BKA POA  Mild rhabdomyolysis  Hx schizophrenia-no behavioral issues  CKD stage 3    Continue current lasix as PO  Continue synthroid    Discussed with Dr. Maria E Odell and plan is to discharge first thing in the morning,  Disposition :  Patient Active Problem List   Diagnosis Code    Chronic hepatitis C ([facility] B18.2    [facility] Z89.519    Paranoid schizophrenia, subchronic condition [facility] F20.0    Severe hypothyroidism E03.8    Pulmonary edema J81.1    Acute on chronic combined systolic and diastolic CHF, NYHA class 4 (HCC) I50.43       Subjective: Went down for stress test this morning and feels that his breakfast was held for him.   Even though it lunch has began, patient very pleasantly adamant that he wants his help breakfast    Otherwise doing well is looking forward to going home soon as possible  Review of systems:    Constitutional: denies fevers, chills  Respiratory: denies SOB  Cardiovascular: denies chest pain  Gastrointestinal: denies nausea, vomiting, diarrhea      Vital signs/Intake and Output:  Visit Vitals  BP (!) 134/90   Pulse 81   Temp 97.9 °F (36.6 °C)   Resp 18   Ht 5' 2\" (1.575 m)   Wt 56.7 kg (125 lb)   SpO2 95%   BMI 22.86 kg/m²     Current Shift:  09/22 0701 - 09/22 1900  In: -   Out: 120 [Urine:120]  Last three shifts:  09/20 1901 - 09/22 0700  In: 480 [P.O.:480]  Out: 2735 [Urine:2735]    Exam:    General: thin  HM, alert, NAD, OX3  Head/Neck: NCAT, supple, No masses, No lymphadenopathy  CVS:Regular rate and rhythm, no M/R/G, S1/S2 heard, no thrill  Lungs:Clear to auscultation bilaterally, no wheezes, rhonchi, or rales  Abdomen: Soft, Nontender, No distention, Normal Bowel sounds, No hepatomegaly  Extremities: BKA unilateral  Neuro:grossly normal , follows commands  Psych:appropriate                Labs: Results:       Chemistry Recent Labs     09/22/20  0436 09/20/20  0050   GLU 83 81    140   K 4.2 4.0   CL 96* 100   CO2 31 33*   BUN 28* 22*   CREA 1.89* 1.86*   CA 9.1 9.2   AGAP 9 7   BUCR 15 12   AP  --  29*   TP  --  6.8   ALB  --  3.9   GLOB  --  2.9   AGRAT  --  1.3      CBC w/Diff Recent Labs     09/22/20 0436 09/20/20  0050   WBC 5.9 8.0   RBC 3.58* 3.91*   HGB 11.5* 12.4*   HCT 34.1* 37.6    169   GRANS  --  73   LYMPH  --  20*   EOS  --  1      Cardiac Enzymes No results for input(s): CPK, CKND1, YANETH in the last 72 hours. No lab exists for component: CKRMB, TROIP   Coagulation Recent Labs     09/21/20 0520 09/20/20 0050   PTP 13.0 13.4   INR 1.0 1.0       Lipid Panel No results found for: CHOL, CHOLPOCT, CHOLX, CHLST, CHOLV, 458865, HDL, HDLP, LDL, LDLC, DLDLP, 141843, VLDLC, VLDL, TGLX, TRIGL, TRIGP, TGLPOCT, CHHD, CHHDX   BNP No results for input(s): BNPP in the last 72 hours.    Liver Enzymes Recent Labs     09/20/20  0050   TP 6.8   ALB 3.9   AP 29*      Thyroid Studies Lab Results   Component Value Date/Time    .00 (H) 09/21/2020 05:20 AM        Procedures/imaging: see electronic medical records for all procedures/Xrays and details which were not copied into this note but were reviewed prior to creation of Harshad Sexton MD

## 2020-09-23 LAB
ANION GAP SERPL CALC-SCNC: 7 MMOL/L (ref 3–18)
ANION GAP SERPL CALC-SCNC: 8 MMOL/L (ref 3–18)
BUN SERPL-MCNC: 32 MG/DL (ref 7–18)
BUN SERPL-MCNC: 34 MG/DL (ref 7–18)
BUN/CREAT SERPL: 15 (ref 12–20)
BUN/CREAT SERPL: 16 (ref 12–20)
CALCIUM SERPL-MCNC: 9 MG/DL (ref 8.5–10.1)
CALCIUM SERPL-MCNC: 9.1 MG/DL (ref 8.5–10.1)
CHLORIDE SERPL-SCNC: 101 MMOL/L (ref 100–111)
CHLORIDE SERPL-SCNC: 99 MMOL/L (ref 100–111)
CO2 SERPL-SCNC: 31 MMOL/L (ref 21–32)
CO2 SERPL-SCNC: 32 MMOL/L (ref 21–32)
CREAT SERPL-MCNC: 1.99 MG/DL (ref 0.6–1.3)
CREAT SERPL-MCNC: 2.32 MG/DL (ref 0.6–1.3)
ERYTHROCYTE [DISTWIDTH] IN BLOOD BY AUTOMATED COUNT: 16.2 % (ref 11.6–14.5)
GLUCOSE SERPL-MCNC: 109 MG/DL (ref 74–99)
GLUCOSE SERPL-MCNC: 81 MG/DL (ref 74–99)
HCT VFR BLD AUTO: 31.8 % (ref 36–48)
HGB BLD-MCNC: 10.4 G/DL (ref 13–16)
MAGNESIUM SERPL-MCNC: 2.4 MG/DL (ref 1.6–2.6)
MCH RBC QN AUTO: 31.8 PG (ref 24–34)
MCHC RBC AUTO-ENTMCNC: 32.7 G/DL (ref 31–37)
MCV RBC AUTO: 97.2 FL (ref 74–97)
PLATELET # BLD AUTO: 165 K/UL (ref 135–420)
PMV BLD AUTO: 13.4 FL (ref 9.2–11.8)
POTASSIUM SERPL-SCNC: 3.4 MMOL/L (ref 3.5–5.5)
POTASSIUM SERPL-SCNC: 3.6 MMOL/L (ref 3.5–5.5)
RBC # BLD AUTO: 3.27 M/UL (ref 4.7–5.5)
SODIUM SERPL-SCNC: 138 MMOL/L (ref 136–145)
SODIUM SERPL-SCNC: 140 MMOL/L (ref 136–145)
WBC # BLD AUTO: 5.9 K/UL (ref 4.6–13.2)

## 2020-09-23 PROCEDURE — 80048 BASIC METABOLIC PNL TOTAL CA: CPT

## 2020-09-23 PROCEDURE — 74011250637 HC RX REV CODE- 250/637: Performed by: HOSPITALIST

## 2020-09-23 PROCEDURE — 85027 COMPLETE CBC AUTOMATED: CPT

## 2020-09-23 PROCEDURE — 97116 GAIT TRAINING THERAPY: CPT

## 2020-09-23 PROCEDURE — 74011250636 HC RX REV CODE- 250/636: Performed by: INTERNAL MEDICINE

## 2020-09-23 PROCEDURE — 97110 THERAPEUTIC EXERCISES: CPT

## 2020-09-23 PROCEDURE — 74011250637 HC RX REV CODE- 250/637: Performed by: INTERNAL MEDICINE

## 2020-09-23 PROCEDURE — 65660000000 HC RM CCU STEPDOWN

## 2020-09-23 PROCEDURE — 83735 ASSAY OF MAGNESIUM: CPT

## 2020-09-23 PROCEDURE — 36415 COLL VENOUS BLD VENIPUNCTURE: CPT

## 2020-09-23 PROCEDURE — 97530 THERAPEUTIC ACTIVITIES: CPT

## 2020-09-23 RX ORDER — FUROSEMIDE 20 MG/1
20 TABLET ORAL DAILY
Qty: 30 TAB | Refills: 1 | Status: SHIPPED | OUTPATIENT
Start: 2020-09-23

## 2020-09-23 RX ORDER — LEVOTHYROXINE SODIUM 125 UG/1
125 TABLET ORAL DAILY
Qty: 30 TAB | Refills: 2 | Status: SHIPPED | OUTPATIENT
Start: 2020-09-24

## 2020-09-23 RX ORDER — RISPERIDONE 0.5 MG/1
0.5 TABLET, FILM COATED ORAL 2 TIMES DAILY
Qty: 60 TAB | Refills: 1 | Status: SHIPPED | OUTPATIENT
Start: 2020-09-23

## 2020-09-23 RX ORDER — FUROSEMIDE 20 MG/1
20 TABLET ORAL DAILY
Status: DISCONTINUED | OUTPATIENT
Start: 2020-09-24 | End: 2020-09-24 | Stop reason: HOSPADM

## 2020-09-23 RX ORDER — METOPROLOL SUCCINATE 25 MG/1
25 TABLET, EXTENDED RELEASE ORAL DAILY
Qty: 30 TAB | Refills: 1 | Status: SHIPPED | OUTPATIENT
Start: 2020-09-24

## 2020-09-23 RX ORDER — ISOSORBIDE DINITRATE 5 MG/1
2.5 TABLET ORAL 2 TIMES DAILY
Qty: 60 TAB | Refills: 1 | Status: SHIPPED | OUTPATIENT
Start: 2020-09-23 | End: 2020-09-24

## 2020-09-23 RX ADMIN — Medication 10 ML: at 06:39

## 2020-09-23 RX ADMIN — LEVOTHYROXINE SODIUM 125 MCG: 0.03 TABLET ORAL at 08:10

## 2020-09-23 RX ADMIN — METOPROLOL SUCCINATE 25 MG: 25 TABLET, EXTENDED RELEASE ORAL at 08:10

## 2020-09-23 RX ADMIN — PANTOPRAZOLE SODIUM 40 MG: 40 TABLET, DELAYED RELEASE ORAL at 06:39

## 2020-09-23 RX ADMIN — ENOXAPARIN SODIUM 30 MG: 40 INJECTION SUBCUTANEOUS at 08:10

## 2020-09-23 RX ADMIN — RISPERIDONE 0.5 MG: 0.25 TABLET ORAL at 08:10

## 2020-09-23 RX ADMIN — Medication 10 ML: at 14:00

## 2020-09-23 RX ADMIN — ISOSORBIDE DINITRATE 2.5 MG: 5 TABLET ORAL at 09:32

## 2020-09-23 RX ADMIN — ISOSORBIDE DINITRATE 2.5 MG: 5 TABLET ORAL at 22:49

## 2020-09-23 RX ADMIN — RISPERIDONE 0.5 MG: 0.25 TABLET ORAL at 22:11

## 2020-09-23 RX ADMIN — FUROSEMIDE 40 MG: 40 TABLET ORAL at 08:09

## 2020-09-23 NOTE — PROGRESS NOTES
DC Plan: Discharge home with [facility], MD follow up, family assistance once medically stable. Life Vest?     Chart reviewed. Pt admitted to tele. No dc order noted. Pt covid neg 9/18. Met with pt at bedside to discuss dc plan. CM role explained and CM number put on communication board. PT recommending HH. Pt has been arranged with [facility] per previous CM notes and [facility]. Pt has prosthetic, RW, cane. Pt live alone, but has family nearby. Pt states his sister will drive him home at discharge. Noted in rounds pt may need life vest, clinicals faxed to [facility], will need order. Lety Leigh with [facility] notified of potential need for life vest, he will follow up with MD Diallo's office.   CM will cont to follow for transition of care needs [phone] with [place], they have orders for life vest, awaiting approval, hopeful that can happen tonight

## 2020-09-23 NOTE — PROGRESS NOTES
1900: Bedside shift change report given to DEIDRE Hilario RN (oncoming nurse) by Elle Moran (offgoing nurse). Report included the following information SBAR, Kardex, Procedure Summary, Intake/Output, MAR and Accordion. Assumed care of the patient. 0345: [facility] Night Shift Chart Audit    Chart Audit completed? YES    0700: Shift summary: Shift uneventful, No complaints of chest pain or shortness of breath. Bedside shift change report given to YAYO Moran RN  (oncoming nurse) by DEIDRE Hilario RN (offgoing nurse). Report included the following information SBAR, Kardex, Procedure Summary, Intake/Output and MAR.      Selena Carrillo

## 2020-09-23 NOTE — PROGRESS NOTES
Bedside shift change report given to Chad Cartagena RN (oncoming nurse) by James Carlton RN (offgoing nurse). Report included the following information SBAR, Kardex, ED Summary, Intake/Output, MAR and Accordion.

## 2020-09-23 NOTE — PROGRESS NOTES
Bedside shift change report given to [address] [place] [address] (oncoming nurse) by Ed Kvng RN (offgoing nurse). Report included the following information SBAR, Kardex, ED Summary, Intake/Output, MAR and Accordion. 2787 Shift assessment complete. 1200 Discharge order acknowledged. Patient is waiting for life vest.     1700 Life vest is unable to come today. They stated they will come first thing in the morning per Lizzy CM. Patient aware of plan. Shift Summary: Shift uneventful. No complaints of chest pain or shortness of breath. Call light is within reach.

## 2020-09-23 NOTE — PROGRESS NOTES
Problem: Mobility Impaired (Adult and Pediatric)  Goal: *Acute Goals and Plan of Care (Insert Text)  Description: Physical Therapy Goals   Initiated 9/18/2020 and to be accomplished within 3-5 day(s)  1. Patient will move from supine <> sit with S in prep for out of bed activity and change of position. 2.  Patient will perform sit<> stand with S with LRAD and prosthetic Lower limb in place in prep for transfers/ambulation. 3.  Patient will transfer from bed <> chair with S  with LRAD and prosthetic Lower limb in place for time up in chair for completion of ADL activity. 4.  Patient will ambulate 50 feet with S/LRAD and prosthetic Lower limb in place for improved functional mobility at discharge. 5.  Patient will ascend/descend 3-5 stairs with handrail(s) with minimal assistance/contact guard assist for home re-entry as needed. Outcome: Not Progressing Towards Goal     Pt refused PT due to:  [x]  \"I'm ordering lunch. I think I'm about to leave. \"  []  Eating  []  Pain  []  Pt lethargic  []  Off Unit  Will f/u later, as pt's schedule allows. Thank you.   Connie Hadley, PTA

## 2020-09-23 NOTE — DISCHARGE INSTRUCTIONS
Patient Education        Heart Failure: Care Instructions  Your Care Instructions     Heart failure occurs when your heart does not pump as much blood as the body needs. Failure does not mean that the heart has stopped pumping but rather that it is not pumping as well as it should. Over time, this causes fluid buildup in your lungs and other parts of your body. Fluid buildup can cause shortness of breath, fatigue, swollen ankles, and other problems. By taking medicines regularly, reducing sodium (salt) in your diet, checking your weight every day, and making lifestyle changes, you can feel better and live longer. Follow-up care is a key part of your treatment and safety. Be sure to make and go to all appointments, and call your doctor if you are having problems. It's also a good idea to know your test results and keep a list of the medicines you take. How can you care for yourself at home? Medicines    · Be safe with medicines. Take your medicines exactly as prescribed. Call your doctor if you think you are having a problem with your medicine.     · Do not take any vitamins, over-the-counter medicine, or herbal products without talking to your doctor first. Belinda Morita not take ibuprofen (Advil or Motrin) and naproxen (Aleve) without talking to your doctor first. They could make your heart failure worse.     · You may take some of the following medicine. ? Angiotensin-converting enzyme inhibitors (ACEIs) or angiotensin II receptor blockers (ARBs) reduce the heart's workload, lower blood pressure, and reduce swelling. Taking an ACEI or ARB may lower your chance of needing to be hospitalized. ? Beta-blockers can slow heart rate, decrease blood pressure, and improve your condition. Taking a beta-blocker may lower your chance of needing to be hospitalized. ? Diuretics, also called water pills, reduce swelling. You will get more details on the specific medicines your doctor prescribes.   Diet    · Your doctor may suggest that you limit sodium. Your doctor can tell you how much sodium is right for you. An example is less than 3,000 mg a day. This includes all the salt you eat in cooking or in packaged foods. People get most of their sodium from processed foods. Fast food and restaurant meals also tend to be very high in sodium.     · Ask your doctor how much liquid you can drink each day. You may have to limit liquids. Weight    · Weigh yourself without clothing at the same time each day. Record your weight. Call your doctor if you have a sudden weight gain, such as more than 2 to 3 pounds in a day or 5 pounds in a week. (Your doctor may suggest a different range of weight gain.) A sudden weight gain may mean that your heart failure is getting worse. Activity level    · Start light exercise (if your doctor says it is okay). Even if you can only do a small amount, exercise will help you get stronger, have more energy, and manage your weight and your stress. Walking is an easy way to get exercise. Start out by walking a little more than you did before. Bit by bit, increase the amount you walk.     · When you exercise, watch for signs that your heart is working too hard. You are pushing yourself too hard if you cannot talk while you are exercising. If you become short of breath or dizzy or have chest pain, stop, sit down, and rest.     · If you feel \"wiped out\" the day after you exercise, walk slower or for a shorter distance until you can work up to a better pace.     · Get enough rest at night. Sleeping with 1 or 2 pillows under your upper body and head may help you breathe easier. Lifestyle changes    · Do not smoke. Smoking can make a heart condition worse. If you need help quitting, talk to your doctor about stop-smoking programs and medicines. These can increase your chances of quitting for good.  Quitting smoking may be the most important step you can take to protect your heart.     · Limit alcohol to 2 drinks a day for men and 1 drink a day for women. Too much alcohol can cause health problems.     · Avoid getting sick from colds and the flu. Get a pneumococcal vaccine shot. If you have had one before, ask your doctor whether you need another dose. Get a flu shot each year. If you must be around people with colds or the flu, wash your hands often. When should you call for help? Call 911 if you have symptoms of sudden heart failure such as:    · You have severe trouble breathing.     · You cough up pink, foamy mucus.     · You have a new irregular or rapid heartbeat. Call your doctor now or seek immediate medical care if:    · You have new or increased shortness of breath.     · You are dizzy or lightheaded, or you feel like you may faint.     · You have sudden weight gain, such as more than 2 to 3 pounds in a day or 5 pounds in a week. (Your doctor may suggest a different range of weight gain.)     · You have increased swelling in your legs, ankles, or feet.     · You are suddenly so tired or weak that you cannot do your usual activities. Watch closely for changes in your health, and be sure to contact your doctor if you develop new symptoms. Where can you learn more? Go to http://timmy-jillian.info/  Enter G662 in the search box to learn more about \"Heart Failure: Care Instructions. \"  Current as of: December 16, 2019               Content Version: 12.6  © 0961-7625 [employer]. Care instructions adapted under license by [employer] (which disclaims liability or warranty for this information). If you have questions about a medical condition or this instruction, always ask your healthcare professional. [place] [address] any warranty or liability for your use of this information.

## 2020-09-23 NOTE — PROGRESS NOTES
Problem: Falls - Risk of  Goal: *Absence of Falls  Description: Document Bethany Miranda Fall Risk and appropriate interventions in the flowsheet. Outcome: Progressing Towards Goal  Note: Fall Risk Interventions:  Mobility Interventions: Communicate number of staff needed for ambulation/transfer         Medication Interventions: Patient to call before getting OOB    Elimination Interventions: Call light in reach, Urinal in reach              Problem: Pain  Goal: *Control of Pain  Outcome: Progressing Towards Goal     Problem: Fluid Volume - Risk of, Imbalanced  Goal: *Balanced intake and output  Outcome: Progressing Towards Goal     Problem: Pressure Injury - Risk of  Goal: *Prevention of pressure injury  Description: Document Frederic Scale and appropriate interventions in the flowsheet.   Outcome: Progressing Towards Goal  Note: Pressure Injury Interventions:  Sensory Interventions: Assess need for specialty bed, Assess changes in LOC, Discuss PT/OT consult with provider    Moisture Interventions: Absorbent underpads, Assess need for specialty bed    Activity Interventions: Pressure redistribution bed/mattress(bed type), PT/OT evaluation    Mobility Interventions: Pressure redistribution bed/mattress (bed type), PT/OT evaluation    Nutrition Interventions: Offer support with meals,snacks and hydration                     Problem: Heart Failure: Discharge Outcomes  Goal: *Demonstrates ability to perform prescribed activity without shortness of breath or discomfort  Outcome: Progressing Towards Goal     Problem: Heart Failure: Discharge Outcomes  Goal: *Left ventricular function assessment completed prior to or during stay, or planned for post-discharge  Outcome: Progressing Towards Goal     Problem: Heart Failure: Discharge Outcomes  Goal: *ACEI prescribed if LVEF less than 40% and no contraindications or ARB prescribed  Outcome: Progressing Towards Goal     Problem: Heart Failure: Discharge Outcomes  Goal: *Verbalizes understanding and describes prescribed diet  Outcome: Progressing Towards Goal     Problem: Heart Failure: Discharge Outcomes  Goal: *Verbalizes understanding/describes prescribed medications  Outcome: Progressing Towards Goal     Problem: Heart Failure: Discharge Outcomes  Goal: *Describes available resources and support systems  Description: (eg: Home Health, Palliative Care, Advanced Medical Directive)  Outcome: Progressing Towards Goal

## 2020-09-23 NOTE — PROGRESS NOTES
Problem: Falls - Risk of  Goal: *Absence of Falls  Description: Document Karina Sellers Fall Risk and appropriate interventions in the flowsheet. Outcome: Progressing Towards Goal  Note: Fall Risk Interventions:  Mobility Interventions: Assess mobility with egress test, Bed/chair exit alarm, Communicate number of staff needed for ambulation/transfer, OT consult for ADLs, Patient to call before getting OOB         Medication Interventions: Assess postural VS orthostatic hypotension, Bed/chair exit alarm, Patient to call before getting OOB, Evaluate medications/consider consulting pharmacy    Elimination Interventions: Call light in reach              Problem: Patient Education: Go to Patient Education Activity  Goal: Patient/Family Education  Outcome: Progressing Towards Goal     Problem: Pain  Goal: *Control of Pain  Outcome: Progressing Towards Goal     Problem: Patient Education: Go to Patient Education Activity  Goal: Patient/Family Education  Outcome: Progressing Towards Goal     Problem: Fluid Volume - Risk of, Imbalanced  Goal: *Balanced intake and output  Outcome: Progressing Towards Goal     Problem: Patient Education: Go to Patient Education Activity  Goal: Patient/Family Education  Outcome: Progressing Towards Goal     Problem: Pressure Injury - Risk of  Goal: *Prevention of pressure injury  Description: Document Frederic Scale and appropriate interventions in the flowsheet.   Outcome: Progressing Towards Goal  Note: Pressure Injury Interventions:  Sensory Interventions: Assess changes in LOC, Assess need for specialty bed, Avoid rigorous massage over bony prominences, Check visual cues for pain    Moisture Interventions: Absorbent underpads, Apply protective barrier, creams and emollients, Assess need for specialty bed    Activity Interventions: Assess need for specialty bed, Increase time out of bed, Pressure redistribution bed/mattress(bed type), PT/OT evaluation    Mobility Interventions: Assess need for specialty bed, HOB 30 degrees or less, Pressure redistribution bed/mattress (bed type), PT/OT evaluation    Nutrition Interventions: Document food/fluid/supplement intake, Discuss nutritional consult with provider                     Problem: Patient Education: Go to Patient Education Activity  Goal: Patient/Family Education  Outcome: Progressing Towards Goal     Problem: Patient Education: Go to Patient Education Activity  Goal: Patient/Family Education  Outcome: Progressing Towards Goal     Problem: Patient Education: Go to Patient Education Activity  Goal: Patient/Family Education  Outcome: Progressing Towards Goal     Problem: Heart Failure: Day 1  Goal: Off Pathway (Use only if patient is Off Pathway)  Outcome: Progressing Towards Goal  Goal: Activity/Safety  Outcome: Progressing Towards Goal  Goal: Consults, if ordered  Outcome: Progressing Towards Goal  Goal: Diagnostic Test/Procedures  Outcome: Progressing Towards Goal  Goal: Nutrition/Diet  Outcome: Progressing Towards Goal  Goal: Discharge Planning  Outcome: Progressing Towards Goal  Goal: Medications  Outcome: Progressing Towards Goal  Goal: Respiratory  Outcome: Progressing Towards Goal  Goal: Treatments/Interventions/Procedures  Outcome: Progressing Towards Goal  Goal: Psychosocial  Outcome: Progressing Towards Goal  Goal: *Oxygen saturation within defined limits  Outcome: Progressing Towards Goal  Goal: *Hemodynamically stable  Outcome: Progressing Towards Goal  Goal: *Optimal pain control at patient's stated goal  Outcome: Progressing Towards Goal  Goal: *Anxiety reduced or absent  Outcome: Progressing Towards Goal     Problem: Heart Failure: Day 2  Goal: Off Pathway (Use only if patient is Off Pathway)  Outcome: Progressing Towards Goal  Goal: Activity/Safety  Outcome: Progressing Towards Goal  Goal: Consults, if ordered  Outcome: Progressing Towards Goal  Goal: Diagnostic Test/Procedures  Outcome: Progressing Towards Goal  Goal: Nutrition/Diet  Outcome: Progressing Towards Goal  Goal: Discharge Planning  Outcome: Progressing Towards Goal  Goal: Medications  Outcome: Progressing Towards Goal  Goal: Respiratory  Outcome: Progressing Towards Goal  Goal: Treatments/Interventions/Procedures  Outcome: Progressing Towards Goal  Goal: Psychosocial  Outcome: Progressing Towards Goal  Goal: *Oxygen saturation within defined limits  Outcome: Progressing Towards Goal  Goal: *Hemodynamically stable  Outcome: Progressing Towards Goal  Goal: *Optimal pain control at patient's stated goal  Outcome: Progressing Towards Goal  Goal: *Anxiety reduced or absent  Outcome: Progressing Towards Goal  Goal: *Demonstrates progressive activity  Outcome: Progressing Towards Goal     Problem: Heart Failure: Day 3  Goal: Off Pathway (Use only if patient is Off Pathway)  Outcome: Progressing Towards Goal  Goal: Activity/Safety  Outcome: Progressing Towards Goal  Goal: Diagnostic Test/Procedures  Outcome: Progressing Towards Goal  Goal: Nutrition/Diet  Outcome: Progressing Towards Goal  Goal: Discharge Planning  Outcome: Progressing Towards Goal  Goal: Medications  Outcome: Progressing Towards Goal  Goal: Respiratory  Outcome: Progressing Towards Goal  Goal: Treatments/Interventions/Procedures  Outcome: Progressing Towards Goal  Goal: Psychosocial  Outcome: Progressing Towards Goal  Goal: *Oxygen saturation within defined limits  Outcome: Progressing Towards Goal  Goal: *Hemodynamically stable  Outcome: Progressing Towards Goal  Goal: *Optimal pain control at patient's stated goal  Outcome: Progressing Towards Goal  Goal: *Anxiety reduced or absent  Outcome: Progressing Towards Goal  Goal: *Demonstrates progressive activity  Outcome: Progressing Towards Goal     Problem: Heart Failure: Day 4  Goal: Off Pathway (Use only if patient is Off Pathway)  Outcome: Progressing Towards Goal  Goal: Activity/Safety  Outcome: Progressing Towards Goal  Goal: Diagnostic Test/Procedures  Outcome: Progressing Towards Goal  Goal: Nutrition/Diet  Outcome: Progressing Towards Goal  Goal: Discharge Planning  Outcome: Progressing Towards Goal  Goal: Medications  Outcome: Progressing Towards Goal  Goal: Respiratory  Outcome: Progressing Towards Goal  Goal: Treatments/Interventions/Procedures  Outcome: Progressing Towards Goal  Goal: Psychosocial  Outcome: Progressing Towards Goal  Goal: *Oxygen saturation within defined limits  Outcome: Progressing Towards Goal  Goal: *Hemodynamically stable  Outcome: Progressing Towards Goal  Goal: *Optimal pain control at patient's stated goal  Outcome: Progressing Towards Goal  Goal: *Anxiety reduced or absent  Outcome: Progressing Towards Goal  Goal: *Demonstrates progressive activity  Outcome: Progressing Towards Goal     Problem: Heart Failure: Day 5  Goal: Off Pathway (Use only if patient is Off Pathway)  Outcome: Progressing Towards Goal  Goal: Activity/Safety  Outcome: Progressing Towards Goal  Goal: Diagnostic Test/Procedures  Outcome: Progressing Towards Goal  Goal: Nutrition/Diet  Outcome: Progressing Towards Goal  Goal: Discharge Planning  Outcome: Progressing Towards Goal  Goal: Medications  Outcome: Progressing Towards Goal  Goal: Respiratory  Outcome: Progressing Towards Goal  Goal: Treatments/Interventions/Procedures  Outcome: Progressing Towards Goal  Goal: Psychosocial  Outcome: Progressing Towards Goal     Problem: Heart Failure: Discharge Outcomes  Goal: *Demonstrates ability to perform prescribed activity without shortness of breath or discomfort  Outcome: Progressing Towards Goal  Goal: *Left ventricular function assessment completed prior to or during stay, or planned for post-discharge  Outcome: Progressing Towards Goal  Goal: *ACEI prescribed if LVEF less than 40% and no contraindications or ARB prescribed  Outcome: Progressing Towards Goal  Goal: *Verbalizes understanding and describes prescribed diet  Outcome: Progressing Towards Goal  Goal: *Verbalizes understanding/describes prescribed medications  Outcome: Progressing Towards Goal  Goal: *Describes available resources and support systems  Description: (eg: Home Health, Palliative Care, Advanced Medical Directive)  Outcome: Progressing Towards Goal  Goal: *Describes smoking cessation resources  Outcome: Progressing Towards Goal  Goal: *Understands and describes signs and symptoms to report to providers(Stroke Metric)  Outcome: Progressing Towards Goal  Goal: *Describes/verbalizes understanding of follow-up/return appt  Description: (eg: to physicians, diabetes treatment coordinator, and other resources  Outcome: Progressing Towards Goal  Goal: *Describes importance of continuing daily weights and changes to report to physician  Outcome: Progressing Towards Goal

## 2020-09-24 VITALS
DIASTOLIC BLOOD PRESSURE: 50 MMHG | BODY MASS INDEX: 22.6 KG/M2 | WEIGHT: 122.8 LBS | HEIGHT: 62 IN | SYSTOLIC BLOOD PRESSURE: 93 MMHG | TEMPERATURE: 98.7 F | OXYGEN SATURATION: 97 % | RESPIRATION RATE: 16 BRPM | HEART RATE: 65 BPM

## 2020-09-24 LAB
ANION GAP SERPL CALC-SCNC: 8 MMOL/L (ref 3–18)
BUN SERPL-MCNC: 34 MG/DL (ref 7–18)
BUN/CREAT SERPL: 14 (ref 12–20)
CALCIUM SERPL-MCNC: 9.4 MG/DL (ref 8.5–10.1)
CHLORIDE SERPL-SCNC: 99 MMOL/L (ref 100–111)
CO2 SERPL-SCNC: 32 MMOL/L (ref 21–32)
CREAT SERPL-MCNC: 2.36 MG/DL (ref 0.6–1.3)
ERYTHROCYTE [DISTWIDTH] IN BLOOD BY AUTOMATED COUNT: 16.4 % (ref 11.6–14.5)
GLUCOSE SERPL-MCNC: 90 MG/DL (ref 74–99)
HCT VFR BLD AUTO: 32.7 % (ref 36–48)
HGB BLD-MCNC: 10.6 G/DL (ref 13–16)
MCH RBC QN AUTO: 31.6 PG (ref 24–34)
MCHC RBC AUTO-ENTMCNC: 32.4 G/DL (ref 31–37)
MCV RBC AUTO: 97.6 FL (ref 74–97)
PLATELET # BLD AUTO: 175 K/UL (ref 135–420)
PMV BLD AUTO: 13.9 FL (ref 9.2–11.8)
POTASSIUM SERPL-SCNC: 3.8 MMOL/L (ref 3.5–5.5)
RBC # BLD AUTO: 3.35 M/UL (ref 4.7–5.5)
SODIUM SERPL-SCNC: 139 MMOL/L (ref 136–145)
WBC # BLD AUTO: 6.4 K/UL (ref 4.6–13.2)

## 2020-09-24 PROCEDURE — 74011250637 HC RX REV CODE- 250/637: Performed by: INTERNAL MEDICINE

## 2020-09-24 PROCEDURE — 85027 COMPLETE CBC AUTOMATED: CPT

## 2020-09-24 PROCEDURE — 97116 GAIT TRAINING THERAPY: CPT

## 2020-09-24 PROCEDURE — 80048 BASIC METABOLIC PNL TOTAL CA: CPT

## 2020-09-24 PROCEDURE — 74011250636 HC RX REV CODE- 250/636: Performed by: INTERNAL MEDICINE

## 2020-09-24 PROCEDURE — 36415 COLL VENOUS BLD VENIPUNCTURE: CPT

## 2020-09-24 PROCEDURE — 74011250637 HC RX REV CODE- 250/637: Performed by: HOSPITALIST

## 2020-09-24 PROCEDURE — 97530 THERAPEUTIC ACTIVITIES: CPT

## 2020-09-24 RX ORDER — POTASSIUM CHLORIDE 20 MEQ/1
40 TABLET, EXTENDED RELEASE ORAL EVERY 4 HOURS
Status: COMPLETED | OUTPATIENT
Start: 2020-09-24 | End: 2020-09-24

## 2020-09-24 RX ADMIN — ISOSORBIDE DINITRATE 2.5 MG: 5 TABLET ORAL at 12:00

## 2020-09-24 RX ADMIN — POTASSIUM CHLORIDE 40 MEQ: 1500 TABLET, EXTENDED RELEASE ORAL at 06:34

## 2020-09-24 RX ADMIN — METOPROLOL SUCCINATE 25 MG: 25 TABLET, EXTENDED RELEASE ORAL at 08:01

## 2020-09-24 RX ADMIN — Medication 10 ML: at 13:50

## 2020-09-24 RX ADMIN — ENOXAPARIN SODIUM 30 MG: 40 INJECTION SUBCUTANEOUS at 08:07

## 2020-09-24 RX ADMIN — LEVOTHYROXINE SODIUM 125 MCG: 0.03 TABLET ORAL at 08:05

## 2020-09-24 RX ADMIN — FUROSEMIDE 20 MG: 20 TABLET ORAL at 08:13

## 2020-09-24 RX ADMIN — RISPERIDONE 0.5 MG: 0.25 TABLET ORAL at 08:06

## 2020-09-24 RX ADMIN — PANTOPRAZOLE SODIUM 40 MG: 40 TABLET, DELAYED RELEASE ORAL at 06:34

## 2020-09-24 RX ADMIN — Medication 10 ML: at 06:39

## 2020-09-24 RX ADMIN — POTASSIUM CHLORIDE 40 MEQ: 1500 TABLET, EXTENDED RELEASE ORAL at 02:20

## 2020-09-24 NOTE — PROGRESS NOTES
Problem: Falls - Risk of  Goal: *Absence of Falls  Description: Document Jenni Barakat Fall Risk and appropriate interventions in the flowsheet.   Outcome: Progressing Towards Goal  Note: Fall Risk Interventions:  Mobility Interventions: Assess mobility with egress test, Bed/chair exit alarm, Communicate number of staff needed for ambulation/transfer, OT consult for ADLs, Patient to call before getting OOB, PT Consult for mobility concerns, PT Consult for assist device competence, Strengthening exercises (ROM-active/passive), Utilize walker, cane, or other assistive device         Medication Interventions: Bed/chair exit alarm, Evaluate medications/consider consulting pharmacy, Patient to call before getting OOB, Teach patient to arise slowly    Elimination Interventions: Bed/chair exit alarm, Call light in reach, Elevated toilet seat, Patient to call for help with toileting needs, Stay With Me (per policy), Toilet paper/wipes in reach, Toileting schedule/hourly rounds, Urinal in reach              Problem: Patient Education: Go to Patient Education Activity  Goal: Patient/Family Education  Outcome: Progressing Towards Goal

## 2020-09-24 NOTE — PROGRESS NOTES
Cardiology Progress Note        Patient: Martita Alford        Sex: male          DOA: 9/17/2020  YOB: 1955      Age:  60 y.o.        LOS:  LOS: 6 days   Assessment/Plan     Patient Active Problem List   Diagnosis Code    Chronic hepatitis C (HCC) B18.2    S/P BKA (below knee amputation) ([facility] [address] Z89.519    [facility], [facility] F20.[facility]    Severe hypothyroidism E03.8    Pulmonary edema J81.1    Acute on chronic combined systolic and diastolic CHF, NYHA class II/III (HCC) I50.43      Acute decompensated systolic heart failure LVEF 15-20%  Hypokalemia      Echocardiogram revealed    · Left Ventricle: Normal cavity size and wall thickness. The estimated EF is 15 - 20%. Severely reduced systolic function. There is severe (grade 3) left ventricular diastolic dysfunction E/E' ratio is 13.19. There is mild spontaneous echo contrast. Wall Scoring: The left ventricular wall motion is globally hypokinetic. · Tricuspid Valve: Normal valve structure and no stenosis. Tricuspid regurgitation is inadequate for estimation of right ventricular systolic pressure. · Pericardium: Small circumferential pericardial effusion noted. The effusion measures 10 mm. No indications of tamponade present. There is left pleural effusion. There is right pleural effusion. Lexiscan stress test revealed  1) Lexiscan stress test is abnormal but negative for ischemia. 2) Baseline EKG revealed Sinus rhythm,nonspecifc T wave changes. There was no ST changes after injection of Regadenoson or in recovery. 3) There was no perfusion defect in rest or stress study. 4) Global hypokinesis, calculated LVEF 22 %. TID 1.01  5) Can not comment on exercise capacity due to pharmacological study. 6) No previous study to compare.       Plan:    Continue Metoprolol succinate 25 mg once a day. Change Lasix to 20 mg once a day  Continue Isosorbide dinitrate 2.5 mg twice a day.   And low-dose of ACE inhibitor/ARB if blood pressure and renal function permits  Monitor and replace electrolytes as per hospitalist.   Advised about lifevest  Due to nonischemic cardiomyopathy as per Echocardiogram with LVEF 15-20%  Stress test is negative for ischemia   Salt restriction up to 2 g per day and fluid restriction up to 1.2 L per day  Continue management as per hospital medicine  Ok to discharge from cardiac stand point once lifevest is arranged. Plan discussed with patient and he verbally understood. Follow up with PCP in 2-3 days of discharge. Follow up in cardiology clinic in 2 weeks                Subjective:    cc: My breathing is better       REVIEW OF SYSTEMS:     General: No fevers or chills. Cardiovascular: No chest pain,No palpitations, No orthopnea, No PND, Positive shortness of breath, leg swelling, No claudication  Pulmonary: No dyspnea. Gastrointestinal: No nausea, vomiting, bleeding  Neurology: No Dizziness    Objective:      Visit Vitals  /63   Pulse 81   Temp 98 °F (36.7 °C)   Resp 16   Ht 5' 2\" (1.575 m)   Wt 51.9 kg (114 lb 8 oz)   SpO2 98%   BMI 20.94 kg/m²     Body mass index is 20.94 kg/m². Physical Exam:  General Appearance: Comfortable, not using accessory muscles of respiration. HEENT: SALENA. HEAD: Atraumatic  NECK: No JVD, no thyroidomeglay. CAROTIDS: No bruit  LUNGS: Clear bilaterally. HEART: S1+S2 audible, no murmur, no pericardial rub. ABD: Non-tender, BS Audible    EXT: RIGHT lower extremity trace edema, and no cysnosis. , LEFT above-knee amputation  VASCULAR EXAM: Pulses are intact. PSYCHIATRIC EXAM: Mood is appropriate. MUSCULOSKELETAL: Grossly no joint deformity.   NEUROLOGICAL: Alert, follows verbal commands, No motor and sensory deficit  Medication:  Current Facility-Administered Medications   Medication Dose Route Frequency    [START ON 9/24/2020] furosemide (LASIX) tablet 20 mg  20 mg Oral DAILY    metoprolol succinate (TOPROL-XL) XL tablet 25 mg  25 mg Oral DAILY    isosorbide dinitrate (ISORDIL) tablet 2.5 mg  2.5 mg Oral BID    pantoprazole (PROTONIX) tablet 40 mg  40 mg Oral ACB    risperiDONE (RisperDAL) tablet 0.5 mg  0.5 mg Oral BID    enoxaparin (LOVENOX) injection 30 mg  30 mg SubCUTAneous DAILY    levothyroxine (SYNTHROID) tablet 125 mcg  125 mcg Oral DAILY    sodium chloride (NS) flush 5-40 mL  5-40 mL IntraVENous Q8H    sodium chloride (NS) flush 5-40 mL  5-40 mL IntraVENous PRN    acetaminophen (TYLENOL) tablet 650 mg  650 mg Oral Q6H PRN    Or    acetaminophen (TYLENOL) suppository 650 mg  650 mg Rectal Q6H PRN    polyethylene glycol (MIRALAX) packet 17 g  17 g Oral DAILY PRN               Lab/Data Reviewed:       Recent Labs     09/23/20  0530 09/22/20  0436   WBC 5.9 5.9   HGB 10.4* 11.5*   HCT 31.8* 34.1*    173     Recent Labs     09/23/20 2030 09/23/20  0530 09/22/20  0436    138 136   K 3.4* 3.6 4.2    99* 96*   CO2 31 32 31   * 81 83   BUN 34* 32* 28*   CREA 2.32* 1.99* 1.89*   CA 9.0 9.1 9.1       Signed By: Nakia Klein MD     September 21, 2020

## 2020-09-24 NOTE — PROGRESS NOTES
1515   Pt is awake, alert, oriented x4. Sitting on chair. Lungs are clear. Abdomen obese, distended  with active BS. Pt for D/C to home today after pt receives Lifevest.  On report, was told by Outgoing shift that pt's sister prefers SNF placement for pt but pt issists, it is his own decision to go home with [facility]. 's report stated that  [facility] has been arranged for pt. Pt has left leg amputation and has left leg prosthesis. 1644   Pt  Sitting on chair and eating dinner. 1835  INT removed. Cardiac monitor was removed. Life vest tech in and showed pt  How to use vest instructions to pt. Life vest was applied by [employer]. .  1900   D/C instructions given to pt.   1958   Pt d/marie per wheelchair accompanied by family member.

## 2020-09-24 NOTE — PROGRESS NOTES
Problem: Mobility Impaired (Adult and Pediatric)  Goal: *Acute Goals and Plan of Care (Insert Text)  Description: Physical Therapy Goals   Initiated 9/18/2020 and to be accomplished within 3-5 day(s)  1. Patient will move from supine <> sit with S in prep for out of bed activity and change of position. 2.  Patient will perform sit<> stand with S with LRAD and prosthetic Lower limb in place in prep for transfers/ambulation. 3.  Patient will transfer from bed <> chair with S  with LRAD and prosthetic Lower limb in place for time up in chair for completion of ADL activity. 4.  Patient will ambulate 50 feet with S/LRAD and prosthetic Lower limb in place for improved functional mobility at discharge. 5.  Patient will ascend/descend 3-5 stairs with handrail(s) with minimal assistance/contact guard assist for home re-entry as needed. Outcome: Progressing Towards Goal   PHYSICAL THERAPY TREATMENT    Patient: Ivania Adames (10 y.o. male)  Date: 9/24/2020  Diagnosis: Pulmonary edema [J81.1]  Severe hypothyroidism [E03.8]   Acute on chronic combined systolic and diastolic CHF, NYHA class 4 ([facility])       Precautions: Fall, Other (comment)(Prosthetic L Lower Limb)   Chart, physical therapy assessment, plan of care and goals were reviewed. ASSESSMENT:  Pt continues to demonstrate mobility needed for home. Gait remains very slow and random standing rest breaks where taken (not needed). Progression toward goals:  [x]      Improving appropriately and progressing toward goals  []      Improving slowly and progressing toward goals  []      Not making progress toward goals and plan of care will be adjusted     PLAN:  Patient continues to benefit from skilled intervention to address the above impairments. Continue treatment per established plan of care.   Discharge Recommendations:  Home Health  Further Equipment Recommendations for Discharge:  prosthetic device and rolling walker     SUBJECTIVE:   Patient stated I don't think there was ever a real plan to release me.     OBJECTIVE DATA SUMMARY:   Critical Behavior:  Neurologic State: Alert  Orientation Level: Appropriate for age  Cognition: Appropriate decision making, Appropriate for age attention/concentration, Appropriate safety awareness, Follows commands  Safety/Judgement: Awareness of environment, Fall prevention  Functional Mobility Training:  Transfers:  Sit to Stand: Supervision  Stand to Sit: Supervision  Balance:  Sitting: Intact  Standing: Intact; With support  Standing - Static: Good  Standing - Dynamic : Fair  Ambulation/Gait Training:  Distance (ft): 150 Feet (ft)  Assistive Device: Gait belt;Prosthetic device; Walker, rolling  Ambulation - Level of Assistance: Supervision  Gait Abnormalities: Decreased step clearance; Step to gait  Base of Support: Shift to right;Shift to left  Speed/Brigitte: Slow;Pace decreased (<100 feet/min)  Step Length: Left shortened;Right shortened  Swing Pattern: Left asymmetrical;Right asymmetrical  Pain:  Pain Scale 1: Numeric (0 - 10)  Pain Intensity 1: 0  Pain out: 0  Activity Tolerance:   Good  Please refer to the flowsheet for vital signs taken during this treatment.   After treatment:   [x] Patient left in no apparent distress sitting up in chair  [] Patient left in no apparent distress in bed  [x] Call bell left within reach  [x] Nursing notified  [x] Caregiver present  [] Bed alarm activated      Lani Oneill PTA   Time Calculation: 28 mins

## 2020-09-24 NOTE — PROGRESS NOTES
1430- call received from [facility] will be delivered today approx 6pm today, bedside nurse Kehinde Moreland notified, patients sister LAMAR [facility] called cm stating she does not feel like pt should return back home wants patient to go to SNF, patient does not want to go to an SNF wants to return back home, Home health has been arranged thru [facility], patient stated \"I make my own decisions\"    1320- cm contacted [address] [employer] informed cm that all paperwork received from  and sent to insurance for approval approx 30 min ago, waiting on approval, likely around 4pm should have authorization. Cm spoke with [facility] [phone] from [place] [address] regarding life vest, cm was informed that he has been in contact with  yesterday evening regarding life vest and medicare needing acceptable diagnosis for insurance approval, [facility] been working on situation this morning with  office and will update cm regarding life vest.  Care Management Interventions  PCP Verified by CM: Yes  Palliative Care Criteria Met (RRAT>21 & CHF Dx)?: Yes  Mode of Transport at Discharge:  Other (see comment)(sister)  Transition of Care Consult (CM Consult): [address]: No  Reason [facility]: Patient already serviced by other home care/hospice agency  Physical Therapy Consult: Yes  Occupational Therapy Consult: Yes  Current Support Network: Lives Alone  Confirm Follow Up Transport: Family  The Patient and/or Patient Representative was Provided with a Choice of Provider and Agrees with the Discharge Plan?: Yes  Freedom of Choice List was Provided with Basic Dialogue that Supports the Patient's Individualized Plan of Care/Goals, Treatment Preferences and Shares the Quality Data Associated with the Providers?: Yes  Discharge Location  Discharge Placement: Home with home health

## 2020-09-24 NOTE — PROGRESS NOTES
Problem: Mobility Impaired (Adult and Pediatric)  Goal: *Acute Goals and Plan of Care (Insert Text)  Description: Physical Therapy Goals   Initiated 9/18/2020 and to be accomplished within 3-5 day(s)  1. Patient will move from supine <> sit with S in prep for out of bed activity and change of position. 2.  Patient will perform sit<> stand with S with LRAD and prosthetic Lower limb in place in prep for transfers/ambulation. 3.  Patient will transfer from bed <> chair with S  with LRAD and prosthetic Lower limb in place for time up in chair for completion of ADL activity. 4.  Patient will ambulate 50 feet with S/LRAD and prosthetic Lower limb in place for improved functional mobility at discharge. 5.  Patient will ascend/descend 3-5 stairs with handrail(s) with minimal assistance/contact guard assist for home re-entry as needed. 9/23/2020 2249 by Deanne Farah PTA  Outcome: Progressing Towards Goal     PHYSICAL THERAPY TREATMENT    Patient: Olga De (29 y.o. male)  Date: 9/23/2020  Diagnosis: Pulmonary edema [J81.1]  Severe hypothyroidism [E03.8]   Acute on chronic combined systolic and diastolic CHF, NYHA class 4 ([facility])       Precautions: Fall, Other (comment)(Prosthetic L Lower Limb)   Chart, physical therapy assessment, plan of care and goals were reviewed. ASSESSMENT:  Pt with increased delay of mental processing and response time. Gait is very slow today. Pt notes not wanting to rush with ill-fitting prosthetic. When responding to concern about ability to be independent in home, Pt notes feeling very capable of caring for himself and wanting to seek assistance with being fit for a new prosthetic device. HHPT and personal care should be explored to observe pt, who appears to need more care than he can provide himself.    Progression toward goals:  [x]      Improving appropriately and progressing toward goals  []      Improving slowly and progressing toward goals  []      Not making progress toward goals and plan of care will be adjusted     PLAN:  Patient continues to benefit from skilled intervention to address the above impairments. Continue treatment per established plan of care. Discharge Recommendations:  Home Health PT and personal care  Further Equipment Recommendations for Discharge:  rolling walker     SUBJECTIVE:   Patient stated Thanks for coming back to give me exercise. I thought I was leaving, but not today.     OBJECTIVE DATA SUMMARY:   Critical Behavior:  Neurologic State: Alert  Orientation Level: Oriented X4  Cognition: Appropriate decision making, Appropriate for age attention/concentration, Appropriate safety awareness, Follows commands  Safety/Judgement: Awareness of environment, Fall prevention  Functional Mobility Training:  Transfers:  Sit to Stand: Supervision  Stand to Sit: Supervision  Balance:  Sitting: Intact  Standing: Intact; With support  Standing - Static: Good  Standing - Dynamic : Fair  Ambulation/Gait Training:  Distance (ft): 220 Feet (ft)  Assistive Device: Gait belt;Prosthetic device; Walker, rolling  Ambulation - Level of Assistance: Supervision  Gait Abnormalities: Decreased step clearance; Step to gait  Base of Support: Shift to left; Shift to right  Speed/Brigitte: Slow;Shuffled;Pace decreased (<100 feet/min)  Step Length: Left shortened;Right shortened  Swing Pattern: Left asymmetrical;Right asymmetrical  Pain:  Pain Scale 1: Numeric (0 - 10)  Pain Intensity 1: 0  Pain out: 0  Activity Tolerance:   Good  Please refer to the flowsheet for vital signs taken during this treatment.   After treatment:   [x] Patient left in no apparent distress sitting up in chair  [] Patient left in no apparent distress in bed  [x] Call bell left within reach  [x] Nursing notified  [] Caregiver present  [] Bed alarm activated      Abdon Moulton PTA   Time Calculation: 39 mins

## 2020-09-24 NOTE — PROGRESS NOTES
Cardiology Progress Note        Patient: Nae Merlos        Sex: male          DOA: 9/17/2020  YOB: 1955      Age:  60 y.o.        LOS:  LOS: 7 days   Assessment/Plan     Patient Active Problem List   Diagnosis Code    Chronic hepatitis C (HCC) B18.2    S/P BKA (below knee amputation) ([facility] [address] Z89.519    Paranoid schizophrenia, subchronic condition ([facility] F20.0    Severe hypothyroidism E03.8    Pulmonary edema J81.1    Acute on chronic combined systolic and diastolic CHF, NYHA class II/III (HCC) I50.43      Acute decompensated systolic heart failure LVEF 15-20%  Hypokalemia      Echocardiogram revealed    · Left Ventricle: Normal cavity size and wall thickness. The estimated EF is 15 - 20%. Severely reduced systolic function. There is severe (grade 3) left ventricular diastolic dysfunction E/E' ratio is 13.19. There is mild spontaneous echo contrast. Wall Scoring: The left ventricular wall motion is globally hypokinetic. · Tricuspid Valve: Normal valve structure and no stenosis. Tricuspid regurgitation is inadequate for estimation of right ventricular systolic pressure. · Pericardium: Small circumferential pericardial effusion noted. The effusion measures 10 mm. No indications of tamponade present. There is left pleural effusion. There is right pleural effusion. Lexiscan stress test revealed  1) Lexiscan stress test is abnormal but negative for ischemia. 2) Baseline EKG revealed Sinus rhythm,nonspecifc T wave changes. There was no ST changes after injection of Regadenoson or in recovery. 3) There was no perfusion defect in rest or stress study. 4) Global hypokinesis, calculated LVEF 22 %. TID 1.01  5) Can not comment on exercise capacity due to pharmacological study. 6) No previous study to compare.       Plan:    Continue Metoprolol succinate 25 mg once a day. Change Lasix to 20 mg once a day  Discontinue Isosorbide dinitrate. (Blood pressure is borderline) And low-dose of ACE inhibitor/ARB if blood pressure and renal function permits  Monitor and replace electrolytes as per hospitalist.   Kimberlyha Salvage about lifevest  Due to nonischemic cardiomyopathy as per Echocardiogram with LVEF 15-20%  Stress test is negative for ischemia   Salt restriction up to 2 g per day and fluid restriction up to 1.2 L per day  Continue management as per hospital medicine  Ok to discharge from cardiac stand point once lifevest is arranged. Plan discussed with patient and he verbally understood. Follow up with PCP in 2-3 days of discharge. Follow up in cardiology clinic in 2 weeks                Subjective:    cc: My breathing is better       REVIEW OF SYSTEMS:     General: No fevers or chills. Cardiovascular: No chest pain,No palpitations, No orthopnea, No PND, Positive shortness of breath, leg swelling, No claudication  Pulmonary: No dyspnea. Gastrointestinal: No nausea, vomiting, bleeding  Neurology: No Dizziness    Objective:      Visit Vitals  BP (!) 106/59   Pulse 64   Temp 98.2 °F (36.8 °C)   Resp 16   Ht 5' 2\" (1.575 m)   Wt 55.7 kg (122 lb 12.7 oz)   SpO2 99%   BMI 22.46 kg/m²     Body mass index is 22.46 kg/m². Physical Exam:  General Appearance: Comfortable, not using accessory muscles of respiration. HEENT: SALENA. HEAD: Atraumatic  NECK: No JVD, no thyroidomeglay. CAROTIDS: No bruit  LUNGS: Clear bilaterally. HEART: S1+S2 audible, no murmur, no pericardial rub. ABD: Non-tender, BS Audible    EXT: RIGHT lower extremity trace edema, and no cysnosis. , LEFT above-knee amputation  VASCULAR EXAM: Pulses are intact. PSYCHIATRIC EXAM: Mood is appropriate. MUSCULOSKELETAL: Grossly no joint deformity.   NEUROLOGICAL: Alert, follows verbal commands, No motor and sensory deficit  Medication:  Current Facility-Administered Medications   Medication Dose Route Frequency    furosemide (LASIX) tablet 20 mg  20 mg Oral DAILY    metoprolol succinate (TOPROL-XL) XL tablet 25 mg 25 mg Oral DAILY    pantoprazole (PROTONIX) tablet 40 mg  40 mg Oral ACB    risperiDONE (RisperDAL) tablet 0.5 mg  0.5 mg Oral BID    enoxaparin (LOVENOX) injection 30 mg  30 mg SubCUTAneous DAILY    levothyroxine (SYNTHROID) tablet 125 mcg  125 mcg Oral DAILY    sodium chloride (NS) flush 5-40 mL  5-40 mL IntraVENous Q8H    sodium chloride (NS) flush 5-40 mL  5-40 mL IntraVENous PRN    acetaminophen (TYLENOL) tablet 650 mg  650 mg Oral Q6H PRN    Or    acetaminophen (TYLENOL) suppository 650 mg  650 mg Rectal Q6H PRN    polyethylene glycol (MIRALAX) packet 17 g  17 g Oral DAILY PRN               Lab/Data Reviewed:       Recent Labs     09/24/20 0055 09/23/20  0530 09/22/20  0436   WBC 6.4 5.9 5.9   HGB 10.6* 10.4* 11.5*   HCT 32.7* 31.8* 34.1*    165 173     Recent Labs     09/24/20 0055 09/23/20 2030 09/23/20  0530    140 138   K 3.8 3.4* 3.6   CL 99* 101 99*   CO2 32 31 32   GLU 90 109* 81   BUN 34* 34* 32*   CREA 2.36* 2.32* 1.99*   CA 9.4 9.0 9.1       Signed By: John Boss MD     September 21, 2020

## 2020-09-24 NOTE — PROGRESS NOTES
1900 Bedside and Verbal shift change report given to HANNAH Gonzalez (oncoming nurse) by GIANNI Black RN (offgoing nurse). Report included the following information SBAR, Kardex, MAR, Accordion and Recent Results. 2007 Patient had an 6 second and a 27 second Mobitz 2 rhythm. MD notified and orders received for BMP and magnesium and EKG ordered. 18 Pt had a 23 second Junctional Mobitz-2 heart rhythm. Tele strip is in the chart. Nurse was unable to capture on EKG. 0700 Bedside and Verbal shift change report given to Genesis Teague RN (oncoming nurse) by Pita Gonzalez RN (offgoing nurse).  Report included the following information SBAR, Kardex, Accordion, Recent Results and Cardiac Rhythm SB-SR.

## 2020-09-24 NOTE — DISCHARGE SUMMARY
[address] [facility] SUMMARY    Name:  Sera Hardin  MR#:   [id number]  :  1955  ACCOUNT #:  [de-identified]  ADMIT DATE:  2020  DISCHARGE DATE:  2020      ADDENDUM    Overnight, the patient was held because he did not get his LifeVest fitted yet. There have been no acute clinical issues. They did list in his chart that he had an 11-second and a 27-second Mobitz II rhythm. They ordered labs late last night, then a junctional Mobitz II heart rhythm. Otherwise, he has felt well. No chest pain or shortness of breath. No new issues this morning per Nursing. The patient sitting up, eating his full breakfast, dressed in his chair with the hospital gown on the top of his clothing. BUN and creatinine 34 and 2.36, H and H are 10.6 and 32.7. White blood cell count is normal.  Potassium is 3.8. Magnesium last night was 2.4 and glucose is 90. Of note, and if not noted in the prior records, the patient does have chronic kidney disease. His BUN and creatinine are slightly increased from where they were on admission at 19 and 1.89, now up to 34 and 2.36, likely due to the effects of diuresis but he is clinically better. There have been no other new issues. We are awaiting the LifeVest to be fitted. Blood pressure 128/77, temperature 97.7, pulse 72, respiratory rate 16 this morning, SaO2 100%. He denies chest pain, shortness of breath, palpitations. He states I know I have a weak heart, and I am getting a vest and then I get to go home. Abdomen is benign. Lower extremities, prosthesis on the left lower extremity. Right lower extremity, 1 to 2+ edema. Plan is to discharge on the medications already noted in the chart with fluid restriction and low-sodium diet. His followup is with Home Care, Dr. Courtney Villarreal on 10/01, and Dr. Gaurav Rodriguez on . He will need repeat basic metabolic panel testing at that medicine visit.     He is to continue Lasix, Isordil, Toprol, Synthroid, Risperdal in the interim as he was on prior to admission. We stopped an ACE inhibitor that he was on. He also has Depakote, TriCor, B12 and calcium to take at home. His thyroid was very much out of control while he was here due to the fact that he had been noncompliant with his medication, so his TSH needs to be rechecked when he is back into the internal medicine doctor's office in the next week or two as well. Otherwise, he is clinically stable to proceed with discharge as planned once the LifeVest is fitted for ischemic cardiomyopathy.       Mykel Hebert MD      [id number]  D:  09/24/2020 10:42  T:  09/24/2020 11:55  JOB #:  [id number]

## 2020-09-24 NOTE — PROGRESS NOTES
0730: Bedside and Verbal shift change report given to [facility] (oncoming nurse) by Jennifer Bunn (offgoing nurse). Report included the following information SBAR and Kardex.      56: MD Debra Falk at the bedside to discuss, assess and educate pt on tx plan, pt acknowledges and agrees to tx plan, continue to monitor pt for any change  Dayana Joshua RN

## 2020-09-25 ENCOUNTER — PATIENT OUTREACH (OUTPATIENT)
Dept: CASE MANAGEMENT | Age: 65
End: 2020-09-25

## 2025-02-18 NOTE — CONSULTS
Caregiver Telephone Number    Phone Number for Ride/Caregiver: ROBERT,SPOUSE,[phone]     Who will be staying with you post procedure for the next 24 hours? Name and Phone Number?: ROBERT,SPOUSE,[phone]     [employer] Consult Note      Patient: Shannon Valenzuela MRN: [id number]  SSN: [id number]    YOB: 1955  Age: 60 y.o. Sex: male    Date of Consultation: 09/17/2020  Referring Physician: Tiffanie Dutta MD  Reason for Consultation: CHF    Chief complain: Shortness of breath, leg swelling    HPI: 73-year-old gentleman brought emergency room with complaining of worsening of lower extremity swelling and shortness of breath for past few days. He is complaining of RIGHT lower extremity swelling extending into genitals and lower abdomen. Swelling is getting worse for last 4 months. He denies any chest pain. He denies any dizziness, palpation, presyncope or syncope. He had LEFT below-knee amputation.   He denies any smoking or alcohol abuse    Past Medical History:   Diagnosis Date    Cancer [facility]) 2011    Thyroid    Psychiatric disorder     paranoid schizophrenia     Thyroid disease     hypo     Past Surgical History:   Procedure Laterality Date    HX GI  2018    EGD    HX ORTHOPAEDIC      amputation left- above the knee    HX OTHER SURGICAL      thyroid removal- radioactive iodine treatment    HX OTHER SURGICAL Left     above the knee amputation from a 100 ft fall    HX THYROIDECTOMY  2008     Current Facility-Administered Medications   Medication Dose Route Frequency    potassium chloride (K-DUR, KLOR-CON) SR tablet 20 mEq  20 mEq Oral BID    furosemide (LASIX) injection 20 mg  20 mg IntraVENous BID    risperiDONE (RisperDAL) tablet 0.5 mg  0.5 mg Oral QHS    albumin human 25% (BUMINATE) solution 12.5 g  12.5 g IntraVENous Q6H    levothyroxine (SYNTHROID) tablet 125 mcg  125 mcg Oral DAILY    pantoprazole (PROTONIX) injection 40 mg  40 mg IntraVENous Q24H    sodium chloride (NS) flush 5-40 mL  5-40 mL IntraVENous Q8H    sodium chloride (NS) flush 5-40 mL  5-40 mL IntraVENous PRN    acetaminophen (TYLENOL) tablet 650 mg  650 mg Oral Q6H PRN    Or    acetaminophen (TYLENOL) suppository 650 mg  650 mg Rectal Q6H PRN    polyethylene glycol (MIRALAX) packet 17 g  17 g Oral DAILY PRN    enoxaparin (LOVENOX) injection 40 mg  40 mg SubCUTAneous DAILY       Allergies and Intolerances: Allergies   Allergen Reactions    Prolixin [Fluphenazine Hcl] Seizures       Family History:   History reviewed. No pertinent family history. Social History:   He  reports that he has been smoking. He has a 45.00 pack-year smoking history. He has never used smokeless tobacco.  He  reports no history of alcohol use. Review of Systems:     Gen: No fever, chills, malaise, weight loss/gain. Heent: No headache, rhinorrhea, epistaxis, ear pain, hearing loss, sinus pain, neck pain/stiffness, sore throat. Heart: No chest pain, palpitations, pnd positive shortness of breath, orthopnea. Resp: No cough, hemoptysis, wheezing and dyspnea  GI: No nausea, vomiting, diarrhea, constipation, melena or hematochezia. : No urinary obstruction, dysuria or hematuria. Derm: No rash, new skin lesion or pruritis. Musc/skeletal: Positive bone or joint complains. Vasc: positive edema, no cyanosis or claudication. Endo: No heat/cold intolerance, no polyuria,polydipsia or polyphagia. Neuro: No unilateral weakness, numbness, tingling. No seizures. Heme: No easy bruising or bleeding. Physical:   Patient Vitals for the past 6 hrs:   Temp Pulse Resp BP SpO2   09/18/20 2351 97.8 °F (36.6 °C) 70 20 114/63 93 %   09/18/20 2002 98.4 °F (36.9 °C) 77 20 112/74 93 %         Exam:   General Appearance: Comfortable, not using accessory muscles of respiration. HEENT: SALENA. HEAD: Atraumatic  NECK: No JVD, no thyroidomeglay. CAROTIDS:no bruit  LUNGS: by basilar absent at entry   HEART: S1+S2 audible, no murmur, no pericardial rub. ABD: Non-tender, BS Audible    EXT: RIGHT lower extremity 1+ edema, and no cysnosis. , LEFT above-knee amputation  VASCULAR EXAM: Pulses are intact. PSYCHIATRIC EXAM: Mood is appropriate.   MUSCULOSKELETAL: Grossly no joint deformity. NEUROLOGICAL: Alert, follows simple verbal command, Motor and sensory sytem intact    Review of Data:   LABS:   Lab Results   Component Value Date/Time    WBC 10.3 09/18/2020 12:48 AM    HGB 13.7 09/18/2020 12:48 AM    HCT 41.6 09/18/2020 12:48 AM    PLATELET 777 06/66/6188 12:48 AM     Lab Results   Component Value Date/Time    Sodium 142 09/18/2020 12:48 AM    Potassium 3.4 (L) 09/18/2020 12:48 AM    Chloride 104 09/18/2020 12:48 AM    CO2 32 09/18/2020 12:48 AM    Glucose 74 09/18/2020 12:48 AM    BUN 17 09/18/2020 12:48 AM    Creatinine 1.70 (H) 09/18/2020 12:48 AM     No results found for: CHOL, CHOLX, CHLST, CHOLV, HDL, HDLP, LDL, LDLC, DLDLP, TGLX, TRIGL, TRIGP  No components found for: GPT  No results found for: HBA1C, HGBE8, SJO9SZXM, RZB8OLYD      Cardiology Procedures:   Results for orders placed or performed during the hospital encounter of 09/17/20   EKG, 12 LEAD, INITIAL   Result Value Ref Range    Ventricular Rate 84 BPM    Atrial Rate 84 BPM    P-R Interval 150 ms    QRS Duration 94 ms    Q-T Interval 384 ms    QTC Calculation (Bezet) 453 ms    Calculated P Axis 60 degrees    Calculated R Axis 18 degrees    Calculated T Axis 50 degrees    Diagnosis       Normal sinus rhythm  Possible Anterior infarct , age undetermined  Abnormal ECG  Confirmed by Teresa Romero MD, [facility] ([id number]) on 9/18/2020 12:26:50 AM             Impression / Plan:    Patient Active Problem List   Diagnosis Code    Chronic hepatitis C (HCC) B18.2    S/P BKA (below knee amputation) ([facility] [address] Z89.519    Paranoid schizophrenia in remission ([facility] [address] F20.0    Severe hypothyroidism E03.8    Pulmonary edema J81.1     Acute decompensated systolic heart failure LVEF 1320%    60-year-old gentleman came to emergency room with worsening of shortness of breath and leg swelling.     Echocardiogram revealed    Continue IV Lasix 20 mg twice a day  And low-dose of ACE inhibitor/ARB if blood pressure and renal function permits  And low-dose of beta blocker if heart rate and blood pressure permits  Salt restriction up to 2 g per day and fluid restriction up to 1.2 L per day  Continue management as per hospital medicine  Further cardiac workup as clinically indicated. Patient will be seen by Dr. Gomez Langford on the weekend.       Signed By: Santino Zaragoza MD     September 18, 2020

## (undated) DEVICE — SUTURE VCRL SZ 5-0 L18IN ABSRB UD L16MM PC-3 3/8 CIR PRIM J844G

## (undated) DEVICE — STERILE POLYISOPRENE POWDER-FREE SURGICAL GLOVES WITH EMOLLIENT COATING: Brand: PROTEXIS

## (undated) DEVICE — SUTURE VCRL SZ 2-0 L27IN ABSRB UD L26MM SH 1/2 CIR J417H

## (undated) DEVICE — SPONGE GZ W4XL4IN COT 12 PLY TYP VII WVN C FLD DSGN

## (undated) DEVICE — APPLIER CLP L9.375IN APER 2.1MM CLS L3.8MM 20 SM TI CLP

## (undated) DEVICE — MINOR: Brand: MEDLINE INDUSTRIES, INC.

## (undated) DEVICE — REM POLYHESIVE ADULT PATIENT RETURN ELECTRODE: Brand: VALLEYLAB

## (undated) DEVICE — INSULATED BLADE ELECTRODE: Brand: EDGE

## (undated) DEVICE — SUTURE PERMAHAND SZ 4-0 L12X30IN NONABSORBABLE BLK SILK A303H

## (undated) DEVICE — 1010 S-DRAPE TOWEL DRAPE 10/BX: Brand: STERI-DRAPE™

## (undated) DEVICE — MAGNETIC INSTRUMENT PAD 10" X 16"; MEDIUM; DISPOSABLE: Brand: CARDINAL HEALTH

## (undated) DEVICE — CORD BPLR L12FT DISP

## (undated) DEVICE — Device

## (undated) DEVICE — PACK,EENT,STERILE,PK I: Brand: MEDLINE

## (undated) DEVICE — SPONGE LAP 18X18IN STRL -- 5/PK

## (undated) DEVICE — MEDI-VAC YANK SUCT HNDL W/TPRD BULBOUS TIP: Brand: CARDINAL HEALTH

## (undated) DEVICE — SUTURE NONABSORBABLE MONOFILAMENT 4-0 PS-2 18 IN BLU PROLENE 8682H

## (undated) DEVICE — SUTURE PERMAHAND SZ 3-0 L30IN NONABSORBABLE BLK W/O NDL SA84H

## (undated) DEVICE — AMD ANTIMICROBIAL DRAIN SPONGES, 6 PLY, 0.2% POLYHEXAMETHYLENE BIGUANIDE HCI (PHMB): Brand: EXCILON

## (undated) DEVICE — SUTURE VCRL SZ 3-0 L27IN ABSRB UD L26MM SH 1/2 CIR J416H

## (undated) DEVICE — DERMABOND SKIN ADH 0.7ML -- DERMABOND ADVANCED 12/BX

## (undated) DEVICE — NEEDLE HYPO 25GA L1.5IN BLU POLYPR HUB S STL REG BVL STR

## (undated) DEVICE — GAUZE SPONGES,12 PLY: Brand: CURITY

## (undated) DEVICE — 4-PORT MANIFOLD: Brand: NEPTUNE 2

## (undated) DEVICE — GOWN,SIRUS,NONRNF,SETINSLV,XL,20/CS: Brand: MEDLINE

## (undated) DEVICE — SUT MONOCRYL PLUS UD 4-0 --

## (undated) DEVICE — DRAPE,UTILITY,XL,4/PK,STERILE: Brand: MEDLINE

## (undated) DEVICE — SPONGE SURG DISSECTOR 3/8 IN RND PNUT COTTON WHT STRL DISP

## (undated) DEVICE — SYR 10ML CTRL LR LCK NSAF LF --

## (undated) DEVICE — DEVON™ KNEE AND BODY STRAP 60" X 3" (1.5 M X 7.6 CM): Brand: DEVON

## (undated) DEVICE — STERILE POLYISOPRENE POWDER-FREE SURGICAL GLOVES: Brand: PROTEXIS

## (undated) DEVICE — SUT SLK 3-0 30IN SH BLK --

## (undated) DEVICE — ROUND DISSECTORS: Brand: DEROYAL

## (undated) DEVICE — (D)SHEARS HARM FOCS 9MM -- DUPE USE ITEM 322125

## (undated) DEVICE — APPLIER LIG CLP M L11IN TI STR RNG HNDL FOR 20 CLP DISP

## (undated) DEVICE — 3M™ STERI-DRAPE™ INSTRUMENT POUCH 1018: Brand: STERI-DRAPE™